# Patient Record
Sex: FEMALE | Race: ASIAN | NOT HISPANIC OR LATINO | Employment: OTHER | URBAN - METROPOLITAN AREA
[De-identification: names, ages, dates, MRNs, and addresses within clinical notes are randomized per-mention and may not be internally consistent; named-entity substitution may affect disease eponyms.]

---

## 2017-04-08 ENCOUNTER — THERAPY VISIT (OUTPATIENT)
Dept: PHYSICAL THERAPY | Facility: CLINIC | Age: 72
End: 2017-04-08
Payer: MEDICARE

## 2017-04-08 DIAGNOSIS — M54.2 CERVICALGIA: Primary | ICD-10-CM

## 2017-04-08 PROCEDURE — 97161 PT EVAL LOW COMPLEX 20 MIN: CPT | Mod: GP | Performed by: PHYSICAL THERAPIST

## 2017-04-08 PROCEDURE — 97140 MANUAL THERAPY 1/> REGIONS: CPT | Mod: GP | Performed by: PHYSICAL THERAPIST

## 2017-04-08 PROCEDURE — G8982 BODY POS GOAL STATUS: HCPCS | Mod: GP | Performed by: PHYSICAL THERAPIST

## 2017-04-08 PROCEDURE — G8981 BODY POS CURRENT STATUS: HCPCS | Mod: GP | Performed by: PHYSICAL THERAPIST

## 2017-04-08 NOTE — LETTER
DEPARTMENT OF HEALTH AND HUMAN SERVICES  CENTERS FOR MEDICARE & MEDICAID SERVICES    PLAN/UPDATED PLAN OF PROGRESS FOR OUTPATIENT REHABILITATION  PATIENTS NAME:  Mary Waters   : 1945  PROVID[facility] NUMBER:    [id number]  [id number]    [facility]  MEDICAL RECORD NUMBER: [id number]   START OF CARE DATE:  SOC Date: 17   TYPE:  PT  PRIMARY/TREATMENT DIAGNOSIS: (Pertinent Medical Diagnosis)  Cervicalgia  VISITS FROM START OF CARE:  Rxs Used: 1   Subjective:  Mary Waters is a 71 year old female with a cervical spine condition.      This is a new condition  Sudden onset of severe pain and stiffness in R neck/shoulder.  She was trying to aggressively stretch this and then started having tingling down R arm and to all her fingers..    Patient reports pain:  Cervical right side.  Radiates to: tignling down R arm.  Pain is described as aching and cramping Episode frequency: constant stiff and sore on R side, but tingling is very intermittent. Pain Scale: 2-3/10.  Associated symptoms:  Tingling. Pain is worse during the day.  Exacerbated by: practicing drumming. and relieved by heat (lying down, massage).  Since onset symptoms are unchanged.  Special testing: none.     General health as reported by patient is good.  Pertinent medical history includes:  Osteoarthritis and history of fractures.    Other surgeries include:  Other (hsterectomy fracture on right leg).  Current medications:  None as reported by patient.  Current occupation is Just retired  Barriers include:  None as reported by patient.  Red flags:  None as reported by patient.  Oswestry Score: 0 %                 Objective:  Standing Alignment:    Shoulder/UE:  Rounded shoulders and protracted scapula R  Cervical/Thoracic Evaluation  AROM:  AROM Cervical:  Flexion:          40  Extension:       47+  Rotation:         Left:     Right:  Side Bend:      Left: 20(nice stretch)     Right:   30+/-  Headaches: none  Cervical Myotomes:  normal  Cervical Palpation:    Tenderness present at Right:    Upper Trap and Levator  Spinal Segmental Conclusions:  Hypomobile C6-8        Shoulder Evaluation:  ROM:  AROM:  normal    Assessment/Plan:    Patient is a 71 year old female with cervical and lumbar complaints.    Patient has the following significant findings with corresponding treatment plan.                Diagnosis 1:  Neck pain  Pain -  mechanical traction, manual therapy, self management, education, directional preference exercise and home program  Decreased ROM/flexibility - manual therapy, therapeutic exercise and home program  Impaired muscle performance - neuro re-education and home program  Decreased function - therapeutic activities and home program      Therapy Evaluation Codes:   1) History comprised of:   Personal factors that impact the plan of care:      None.    Comorbidity factors that impact the plan of care are:      None.     Medications impacting care: None.  2) Examination of Body Systems comprised of:   Body structures and functions that impact the plan of care:      Cervical spine and Lumbar spine.   Activity limitations that impact the plan of care are:      Lifting, Sports and Walking.  3) Clinical presentation characteristics are:   Stable/Uncomplicated.  4) Decision-Making    Low complexity using standardized patient assessment instrument and/or measureable assessment of functional outcome.    Cumulative Therapy Evaluation is: Low complexity.  Previous and current functional limitations:  (See Goal Flow Sheet for this information)    Short term and Long term goals: (See Goal Flow Sheet for this information)   Communication ability:  Patient appears to be able to clearly communicate and understand verbal and written communication and follow directions correctly.  Treatment Explanation - The following has been discussed with the patient:   RX ordered/plan of care  Anticipated  "outcomes  Possible risks and side effects  This patient would benefit from PT intervention to resume normal activities.   Rehab potential is good.  Frequency:  1 X week, once daily  Duration:  for 3 months  Discharge Plan:  Achieve all LTG.  Independent in home treatment program.  Reach maximal therapeutic benefit.          Caregiver Signature/Credentials _____________________________ Date ________       Treating Provider: joann Avery   I have reviewed and certified the need for these services and plan of treatment while under my care.     PHYSICIAN'S SIGNATURE:   _________________________________________  Date___________   Sam Lanier  Certification period:  Beginning of Cert date period: 04/08/17 to  End of Cert period date: 07/06/17     Functional Level Progress Report: Please see attached \"Goal Flow sheet for Functional level.\"    ____X____ Continue Services or       ________ DC Services                Service dates: From  SOC Date: 04/08/17 date to present                         "

## 2017-04-08 NOTE — MR AVS SNAPSHOT
"              After Visit Summary   4/8/2017    Mary Doi    MRN: [id number]           Patient Information     Date Of Birth          1945        Visit Information        Provider Department      4/8/2017 9:10 AM Pallavi Ruiz PT [facility]        Today's Diagnoses     Cervicalgia    -  1       Follow-ups after your visit        Your next 10 appointments already scheduled     Apr 18, 2017  9:40 AM CDT   [facility] with Pallavi Ruiz PT   [facility] ([facility] [place]  )    [address]  [place] [place] [address] [phone]              Who to contact     If you have questions or need follow up information about today's clinic visit or your schedule please contact [place] [facility] directly at [phone].  Normal or non-critical lab and imaging results will be communicated to you by [employer], letter or phone within 4 business days after the clinic has received the results. If you do not hear from us within 7 days, please contact the clinic through [employer] or phone. If you have a critical or abnormal lab result, we will notify you by phone as soon as possible.  Submit refill requests through [employer] or call your pharmacy and they will forward the refill request to us. Please allow 3 business days for your refill to be completed.          Additional Information About Your Visit        MyChart Information     [employer] lets you send messages to your doctor, view your test results, renew your prescriptions, schedule appointments and more. To sign up, go to [email or web address] . Click on \"Log in\" on the left side of the screen, which will take you to the Welcome page. Then click on \"Sign up Now\" on the right side of the page.     You will be asked to enter the access code listed below, as well as some personal information. Please follow the directions to create your " username and password.     Your access code is: [email or web address]  Expires: 2017 12:21 PM     Your access code will  in 90 days. If you need help or a new code, please call your [place] clinic or [phone].        Care EveryWhere ID     This is your Care EveryWhere ID. This could be used by other organizations to access your [facility] medical records  [id number]         Blood Pressure from Last 3 Encounters:   05 110/72    Weight from Last 3 Encounters:   05 63.5 kg (140 lb)              We Performed the Following     HC PT EVAL, LOW COMPLEXITY     [facility] CERT REPORT     [facility] INITIAL EVAL REPORT     MANUAL THER TECH,1+REGIONS,EA 15 MIN        Primary Care Provider    None Specified       No primary provider on file.        Thank you!     Thank you for choosing [place] [facility]  for your care. Our goal is always to provide you with excellent care. Hearing back from our patients is one way we can continue to improve our services. Please take a few minutes to complete the written survey that you may receive in the mail after your visit with us. Thank you!             Your Updated Medication List - Protect others around you: Learn how to safely use, store and throw away your medicines at www.disposemymeds.org.          This list is accurate as of: 17 12:21 PM.  Always use your most recent med list.                   Brand Name Dispense Instructions for use    CALCIUM 500 + D 500-200 MG-IU Tabs          METROGEL VAGINAL 0.75 % vaginal gel   Generic drug:  metroNIDAZOLE     qs    1 applicator BID x 5 days

## 2017-04-08 NOTE — PROGRESS NOTES
Subjective:    Mary Waters is a 71 year old female with a cervical spine condition.      This is a new condition  Sudden onset of severe pain and stiffness in R neck/shoulder.  She was trying to aggressively stretch this and then started having tingling down R arm and to all her fingers.  She saw her MD for this on 3/23/17.    She also report mild low back pain upon first waking in the am that is an ache and rated 2/10.  She also notes R lateral hip and leg pain that comes on with prolonged standing and walking 15-20 minutes.  This pain is achy and rated 5/10.  Sitting down or rubbing it makes it go away.  This has been present for a couple years, but discussed seeing PT for it on 3/23/17.  It seems to be unchanged in the past year..    Patient reports pain:  Cervical right side.  Radiates to: tignling down R arm.  Pain is described as aching and cramping Episode frequency: constant stiff and sore on R side, but tingling is very intermittent. Pain Scale: 2-3/10.  Associated symptoms:  Tingling. Pain is worse during the day.  Exacerbated by: practicing drumming. and relieved by heat (lying down, massage).  Since onset symptoms are unchanged.  Special testing: none.                                  Patient is a 72 year old female presenting with rehab cervical spine hpi.                       Objective:    Standing Alignment:      Shoulder/UE:  Rounded shoulders and protracted scapula R                             Lumbar/SI Evaluation  ROM:    AROM Lumbar:   Flexion:          100%  Ext:                    50%   Side Bend:        Left:     Right:   Rotation:           Left:     Right:   Side Glide:        Left:  100%    Right:  100%          Lumbar Myotomes:  normal (B glut med 5-/5.  Abdominal strength 3+/5.  Able to pull towel out after legs dropped 15 degrees.)                    Lumbar Palpation:  Palpation (lumbar): no tenderness at B greater troch or bursa.                 Cervical/Thoracic  Evaluation    AROM:  AROM Cervical:    Flexion:          40  Extension:       47+  Rotation:         Left:     Right:  Side Bend:      Left: 20(nice stretch)     Right:  30+/-      Headaches: none  Cervical Myotomes:  normal                        Cervical Palpation:      Tenderness present at Right:    Upper Trap and Levator      Spinal Segmental Conclusions:  Hypomobile C6-8               Shoulder Evaluation:  ROM:  AROM:  normal                                                                             General     ROS    Assessment/Plan:      Patient is a 71 year old female with cervical and lumbar complaints.    Patient has the following significant findings with corresponding treatment plan.                Diagnosis 1:  Neck pain  Pain -  mechanical traction, manual therapy, self management, education, directional preference exercise and home program  Decreased ROM/flexibility - manual therapy, therapeutic exercise and home program  Impaired muscle performance - neuro re-education and home program  Decreased function - therapeutic activities and home program    Therapy Evaluation Codes:   1) History comprised of:   Personal factors that impact the plan of care:      None.    Comorbidity factors that impact the plan of care are:      None.     Medications impacting care: None.  2) Examination of Body Systems comprised of:   Body structures and functions that impact the plan of care:      Cervical spine and Lumbar spine.   Activity limitations that impact the plan of care are:      Lifting, Sports and Walking.  3) Clinical presentation characteristics are:   Stable/Uncomplicated.  4) Decision-Making    Low complexity using standardized patient assessment instrument and/or measureable assessment of functional outcome.  Cumulative Therapy Evaluation is: Low complexity.    Previous and current functional limitations:  (See Goal Flow Sheet for this information)    Short term and Long term goals: (See Goal Flow Sheet  for this information)     Communication ability:  Patient appears to be able to clearly communicate and understand verbal and written communication and follow directions correctly.  Treatment Explanation - The following has been discussed with the patient:   RX ordered/plan of care  Anticipated outcomes  Possible risks and side effects  This patient would benefit from PT intervention to resume normal activities.   Rehab potential is good.    Frequency:  1 X week, once daily  Duration:  for 3 months  Discharge Plan:  Achieve all LTG.  Independent in home treatment program.  Reach maximal therapeutic benefit.    Please refer to the daily flowsheet for treatment today, total treatment time and time spent performing 1:1 timed codes.

## 2017-04-08 NOTE — LETTER
DEPARTMENT OF HEALTH AND HUMAN SERVICES  CENTERS FOR MEDICARE & MEDICAID SERVICES    PLAN/UPDATED PLAN OF PROGRESS FOR OUTPATIENT REHABILITATION    PATIENTS NAME:  Mary Waters   : 1945  PR[facility] NUMBER:    [id number]  [id number]   [facility]  MEDICAL RECORD NUMBER: [id number]   START OF CARE DATE:  SOC Date: 17   TYPE:  PT  PRIMARY/TREATMENT DIAGNOSIS: (Pertinent Medical Diagnosis)  Cervicalgia  VISITS FROM START OF CARE:  Rxs Used: 1   Subjective:  Mary Waters is a 71 year old female with a cervical spine condition.      This is a new condition  Sudden onset of severe pain and stiffness in R neck/shoulder.  She was trying to aggressively stretch this and then started having tingling down R arm and to all her fingers.  She saw her MD for this on 3/23/17.  She also report mild low back pain upon first waking in the am that is an ache and rated 2/10.  She also notes R lateral hip and leg pain that comes on with prolonged standing and walking 15-20 minutes.  This pain is achy and rated 5/10.  Sitting down or rubbing it makes it go away.  This has been present for a couple years, but discussed seeing PT for it on 3/23/17.  It seems to be unchanged in the past year..    Patient reports pain:  Cervical right side.  Radiates to: tignling down R arm.  Pain is described as aching and cramping Episode frequency: constant stiff and sore on R side, but tingling is very intermittent. Pain Scale: 2-3/10.  Associated symptoms:  Tingling. Pain is worse during the day.  Exacerbated by: practicing drumming. and relieved by heat (lying down, massage).  Since onset symptoms are unchanged.  Special testing: none.                      General health as reported by patient is good.  Pertinent medical history includes:  Osteoarthritis and history of fractures.    Other surgeries include:  Other (hsterectomy fracture on right leg).  Current medications:  None as  reported by patient.  Current occupation is Just retired  Barriers include:  None as reported by patient.  Red flags:  None as reported by patient.  Oswestry Score: 0 %               Patient is a 72 year old female presenting with rehab cervical spine hpi.   Objective:  Standing Alignment:    Shoulder/UE:  Rounded shoulders and protracted scapula R  Lumbar/SI Evaluation  ROM:    AROM Lumbar:   Flexion:          100%  Ext:                    50%   Side Bend:        Left:     Right:   Rotation:           Left:     Right:   Side Glide:        Left:  100%    Right:  100%    Lumbar Myotomes:  normal (B glut med 5-/5.  Abdominal strength 3+/5.  Able to pull towel out after legs dropped 15 degrees.)  Lumbar Palpation:  Palpation (lumbar): no tenderness at B greater troch or bursa.  Cervical/Thoracic Evaluation  AROM:  AROM Cervical:  Flexion:          40  Extension:       47+  Rotation:         Left:     Right:  Side Bend:      Left: 20(nice stretch)     Right:  30+/-  Headaches: none  Cervical Myotomes:  normal  Cervical Palpation:    Tenderness present at Right:    Upper Trap and Levator  Spinal Segmental Conclusions:  Hypomobile C6-8  Shoulder Evaluation:  ROM:  AROM:  normal    Assessment/Plan:    Patient is a 71 year old female with cervical and lumbar complaints.    Patient has the following significant findings with corresponding treatment plan.                Diagnosis 1:  Neck pain  Pain -  mechanical traction, manual therapy, self management, education, directional preference exercise and home program  Decreased ROM/flexibility - manual therapy, therapeutic exercise and home program  Impaired muscle performance - neuro re-education and home program  Decreased function - therapeutic activities and home program  Therapy Evaluation Codes:   1) History comprised of:   Personal factors that impact the plan of care:      None.    Comorbidity factors that impact the plan of care are:      None.     Medications impacting  "care: None.  2) Examination of Body Systems comprised of:   Body structures and functions that impact the plan of care:      Cervical spine and Lumbar spine.   Activity limitations that impact the plan of care are:      Lifting, Sports and Walking.  3) Clinical presentation characteristics are:   Stable/Uncomplicated.  4) Decision-Making    Low complexity using standardized patient assessment instrument and/or measureable assessment of functional outcome.  Cumulative Therapy Evaluation is: Low complexity.  Previous and current functional limitations:  (See Goal Flow Sheet for this information)    Short term and Long term goals: (See Goal Flow Sheet for this information)   Communication ability:  Patient appears to be able to clearly communicate and understand verbal and written communication and follow directions correctly.      Treatment Explanation - The following has been discussed with the patient:   RX ordered/plan of care  Anticipated outcomes  Possible risks and side effects  This patient would benefit from PT intervention to resume normal activities.   Rehab potential is good.  Frequency:  1 X week, once daily  Duration:  for 3 months  Discharge Plan:  Achieve all LTG.  Independent in home treatment program.  Reach maximal therapeutic benefit.  Caregiver Signature/Credentials _____________________________ Date ________       Treating Provider: joann Avery   I have reviewed and certified the need for these services and plan of treatment while under my care.      PHYSICIAN'S SIGNATURE:   _________________________________________  Date___________   [place]    Certification period:  Beginning of Cert date period: 04/08/17 to  End of Cert period date: 07/06/17   Functional Level Progress Report: Please see attached \"Goal Flow sheet for Functional level.\"  ____X____ Continue Services or       ________ DC Services                Service dates: From  SOC Date: 04/08/17 date to present                         "

## 2017-04-10 NOTE — PROGRESS NOTES
Subjective:                                     General health as reported by patient is good.  Pertinent medical history includes:  Osteoarthritis and history of fractures.    Other surgeries include:  Other (hsterectomy fracture on right leg).  Current medications:  None as reported by patient.  Current occupation is Just retired  .        Barriers include:  None as reported by patient.    Red flags:  None as reported by patient.      Oswestry Score: 0 %                 Objective:    System    Physical Exam    General     ROS    Assessment/Plan:

## 2017-04-18 ENCOUNTER — THERAPY VISIT (OUTPATIENT)
Dept: PHYSICAL THERAPY | Facility: CLINIC | Age: 72
End: 2017-04-18
Payer: MEDICARE

## 2017-04-18 DIAGNOSIS — M54.2 CERVICALGIA: ICD-10-CM

## 2017-04-18 PROCEDURE — 97110 THERAPEUTIC EXERCISES: CPT | Mod: GP | Performed by: PHYSICAL THERAPIST

## 2017-04-18 PROCEDURE — 97140 MANUAL THERAPY 1/> REGIONS: CPT | Mod: GP | Performed by: PHYSICAL THERAPIST

## 2017-05-04 ENCOUNTER — THERAPY VISIT (OUTPATIENT)
Dept: PHYSICAL THERAPY | Facility: CLINIC | Age: 72
End: 2017-05-04
Payer: MEDICARE

## 2017-05-04 DIAGNOSIS — M54.2 CERVICALGIA: ICD-10-CM

## 2017-05-04 PROCEDURE — 97110 THERAPEUTIC EXERCISES: CPT | Mod: GP | Performed by: PHYSICAL THERAPIST

## 2017-06-15 ENCOUNTER — THERAPY VISIT (OUTPATIENT)
Dept: PHYSICAL THERAPY | Facility: CLINIC | Age: 72
End: 2017-06-15
Payer: MEDICARE

## 2017-06-15 DIAGNOSIS — M54.2 CERVICALGIA: ICD-10-CM

## 2017-06-15 PROCEDURE — 97140 MANUAL THERAPY 1/> REGIONS: CPT | Mod: GP | Performed by: PHYSICAL THERAPIST

## 2017-06-15 PROCEDURE — G8983 BODY POS D/C STATUS: HCPCS | Mod: GP | Performed by: PHYSICAL THERAPIST

## 2017-06-15 PROCEDURE — 97110 THERAPEUTIC EXERCISES: CPT | Mod: GP | Performed by: PHYSICAL THERAPIST

## 2017-06-15 PROCEDURE — G8982 BODY POS GOAL STATUS: HCPCS | Mod: GP | Performed by: PHYSICAL THERAPIST

## 2017-06-15 NOTE — PROGRESS NOTES
Subjective:    HPI  Oswestry Score: 0 %                 Objective:    System    Physical Exam    General     ROS    Assessment/Plan:      DISCHARGE REPORT    Progress reporting period is from 4/8/17 to 6/15/17.       SUBJECTIVE  Subjective changes noted by patient:   Patient just got back from a month in [place].  She had very little to no pain in R hip.  Her stretches helped a lot.  Hasn't had any R neck or arm pain/tingling, but also hasn't been drumming.  She will start back at drumming class this Sunday, but will use only the little drum.    Current Pain level:  (0/10).     Initial Pain level:  (2-3/10 neck, 4/10 R leg).   Changes in function:  Yes (See Goal flowsheet attached for changes in current functional level)  Adverse reaction to treatment or activity: None    OBJECTIVE  Changes noted in objective findings:  Yes,      Cervical ROM: Flex 40, ext 47 and painfree now, L SB 25 and R SB 30 and painfree.    Lumbar ROM: Flex 100%, ext 75%, B %.    Abdominal strength:4+/5.  Able to lower legs 50 degrees before towel comes out.    Glut med 5/5 B.    +OBers B.       ASSESSMENT/PLAN  Updated problem list and treatment plan: Diagnosis 1:  Neck pain  Decreased ROM/flexibility - manual therapy, therapeutic exercise and home program  Decreased strength - therapeutic exercise, therapeutic activities and home program  Impaired muscle performance - neuro re-education and home program  STG/LTGs have been met or progress has been made towards goals:  Yes (See Goal flow sheet completed today.)  Assessment of Progress: The patient's condition is improving.  Self Management Plans:  Patient is independent in a home treatment program.  Patient is independent in self management of symptoms.    Mary continues to require the following intervention to meet STG and LTG's:  PT intervention is no longer required to meet STG/LTG.    Recommendations:  This patient is ready to be discharged from therapy and continue their home  treatment program.    Please refer to the daily flowsheet for treatment today, total treatment time and time spent performing 1:1 timed codes.

## 2017-06-15 NOTE — LETTER
[facility] ATHLETIC [facility] PHYSICAL [facility]  [address]  [place] [id number] [phone]    2017    Re: Mary Waters   :   1945  MRN:  [id number]   REFERRING PHYSICIAN:   Armond Hill    [facility] ATHLETIC [facility] PHYSICAL [facility]    Date of Initial Evaluation:  2017  Visits:  Rxs Used: 4  Reason for Referral:  Cervicalgia    DISCHARGE REPORT    Progress reporting period is from 17 to 6/15/17.       SUBJECTIVE  Subjective changes noted by patient:   Patient just got back from a month in [place].  She had very little to no pain in R hip.  Her stretches helped a lot.  Hasn't had any R neck or arm pain/tingling, but also hasn't been drumming.  She will start back at drumming class this , but will use only the little drum.    Current Pain level:  (0/10).     Initial Pain level:  (2-3/10 neck, 4/10 R leg).   Changes in function:  Yes (See Goal flowsheet attached for changes in current functional level)  Adverse reaction to treatment or activity: None    OBJECTIVE  Changes noted in objective findings:  Yes,      Cervical ROM: Flex 40, ext 47 and painfree now, L SB 25 and R SB 30 and painfree.    Lumbar ROM: Flex 100%, ext 75%, B %.    Abdominal strength:4+/5.  Able to lower legs 50 degrees before towel comes out.    Glut med 5/5 B.    +OBers B.     ASSESSMENT/PLAN  Updated problem list and treatment plan: Diagnosis 1:  Neck pain  Decreased ROM/flexibility - manual therapy, therapeutic exercise and home program  Decreased strength - therapeutic exercise, therapeutic activities and home program  Impaired muscle performance - neuro re-education and home program  STG/LTGs have been met or progress has been made towards goals:  Yes (See Goal flow sheet completed today.)  Assessment of Progress: The patient's condition is improving.  Self Management Plans:  Patient is independent in a home treatment program.  Patient is independent in self  management of symptoms.  Mary continues to require the following intervention to meet STG and LTG's:  PT intervention is no longer required to meet STG/LTG.    Recommendations:  This patient is ready to be discharged from therapy and continue their home treatment program.          Thank you for your referral.    INQUIRIES  Therapist: Pallavi Ruiz [facility]   [facility] PHYSICAL THERAPY  [address]  [place] [address]  Phone: [phone]  Fax: [phone]

## 2017-06-15 NOTE — MR AVS SNAPSHOT
"              After Visit Summary   6/15/2017    Mary Doi    MRN: [id number]           Patient Information     Date Of Birth          1945        Visit Information        Provider Department      6/15/2017 10:20 AM Pallavi Ruiz PT [facility] Athletic [facility] Physical [facility]        Today's Diagnoses     Cervicalgia           Follow-ups after your visit        Who to contact     If you have questions or need follow up information about today's clinic visit or your schedule please contact [facility] ATHLETIC [facility] PHYSICAL [facility] directly at [phone].  Normal or non-critical lab and imaging results will be communicated to you by FOODithart, letter or phone within 4 business days after the clinic has received the results. If you do not hear from us within 7 days, please contact the clinic through FOODithart or phone. If you have a critical or abnormal lab result, we will notify you by phone as soon as possible.  Submit refill requests through [employer] or call your pharmacy and they will forward the refill request to us. Please allow 3 business days for your refill to be completed.          Additional Information About Your Visit        MyChart Information     [employer] lets you send messages to your doctor, view your test results, renew your prescriptions, schedule appointments and more. To sign up, go to [email or web address] . Click on \"Log in\" on the left side of the screen, which will take you to the Welcome page. Then click on \"Sign up Now\" on the right side of the page.     You will be asked to enter the access code listed below, as well as some personal information. Please follow the directions to create your username and password.     Your access code is: [email or web address]  Expires: 2017 12:21 PM     Your access code will  in 90 days. If you need help or a new code, please call your [place] clinic or [phone].        Care EveryWhere ID     This is your Care EveryWhere " ID. This could be used by other organizations to access your [place] medical records  [id number]         Blood Pressure from Last 3 Encounters:   02/18/05 110/72    Weight from Last 3 Encounters:   02/18/05 63.5 kg (140 lb)              We Performed the Following     [facility] PROGRESS NOTES REPORT     MANUAL THER TECH,1+REGIONS,EA 15 MIN     THERAPEUTIC EXERCISES        Primary Care Provider    None Specified       No primary provider on file.        Thank you!     Thank you for choosing [facility] PHYSICAL THERAPY  for your care. Our goal is always to provide you with excellent care. Hearing back from our patients is one way we can continue to improve our services. Please take a few minutes to complete the written survey that you may receive in the mail after your visit with us. Thank you!             Your Updated Medication List - Protect others around you: Learn how to safely use, store and throw away your medicines at www.disposemymeds.org.          This list is accurate as of: 6/15/17  2:06 PM.  Always use your most recent med list.                   Brand Name Dispense Instructions for use    CALCIUM 500 + D 500-200 MG-IU Tabs          METROGEL VAGINAL 0.75 % vaginal gel   Generic drug:  metroNIDAZOLE     qs    1 applicator BID x 5 days

## 2017-09-07 ENCOUNTER — THERAPY VISIT (OUTPATIENT)
Dept: PHYSICAL THERAPY | Facility: CLINIC | Age: 72
End: 2017-09-07
Payer: MEDICARE

## 2017-09-07 DIAGNOSIS — M54.41 RIGHT-SIDED LOW BACK PAIN WITH RIGHT-SIDED SCIATICA, UNSPECIFIED CHRONICITY: Primary | ICD-10-CM

## 2017-09-07 PROCEDURE — 97140 MANUAL THERAPY 1/> REGIONS: CPT | Mod: GP | Performed by: PHYSICAL THERAPIST

## 2017-09-07 PROCEDURE — G8982 BODY POS GOAL STATUS: HCPCS | Mod: GP | Performed by: PHYSICAL THERAPIST

## 2017-09-07 PROCEDURE — G8981 BODY POS CURRENT STATUS: HCPCS | Mod: GP | Performed by: PHYSICAL THERAPIST

## 2017-09-07 PROCEDURE — 97110 THERAPEUTIC EXERCISES: CPT | Mod: GP | Performed by: PHYSICAL THERAPIST

## 2017-09-07 PROCEDURE — 97164 PT RE-EVAL EST PLAN CARE: CPT | Mod: GP | Performed by: PHYSICAL THERAPIST

## 2017-09-07 NOTE — MR AVS SNAPSHOT
After Visit Summary   9/7/2017    Mary Waters    MRN: [id number]           Patient Information     Date Of Birth          1945        Visit Information        Provider Department      9/7/2017 9:40 AM Pallavi Ruiz PT [facility] Athletic [facility] Physical Therapy        Today's Diagnoses     Right-sided low back pain with right-sided sciatica, unspecified chronicity    -  1       Follow-ups after your visit        Your next 10 appointments already scheduled     Sep 14, 2017  9:40 AM CDT   [facility] Spine with Pallavi Ruiz PT   [facility] Athletic [facility] Physical Therapy ([facility]  )    [address]  [place] [address]   [phone]            Sep 19, 2017 10:20 AM CDT   [facility] Spine with Pallavi Ruiz PT   [facility] Physical Therapy ([facility]  )    [address]  [facility] [facility] [address]   [phone]            Sep 26, 2017  9:40 AM CDT   [facility] Spine with Pallavi Ruiz PT   [facility] Physical Therapy ([facility]  )    [address]  [facility] [place] [address]   [phone]              Who to contact     If you have questions or need follow up information about today's clinic visit or your schedule please contact [facility] ATHLETIC [facility] PHYSICAL THERAPY directly at [phone].  Normal or non-critical lab and imaging results will be communicated to you by MyChart, letter or phone within 4 business days after the clinic has received the results. If you do not hear from us within 7 days, please contact the clinic through [employer]Swim[employer] or phone. If you have a critical or abnormal lab result, we will notify you by phone as soon as possible.  Submit refill requests through ZUCHEM or call your pharmacy and they will forward the refill request to us. Please allow 3 business days for your refill to be completed.          Additional Information About Your Visit       "  MyChart Information     Aidin lets you send messages to your doctor, view your test results, renew your prescriptions, schedule appointments and more. To sign up, go to [email or web address] . Click on \"Log in\" on the left side of the screen, which will take you to the Welcome page. Then click on \"Sign up Now\" on the right side of the page.     You will be asked to enter the access code listed below, as well as some personal information. Please follow the directions to create your username and password.     Your access code is: [email or web address]  Expires: 2017  1:27 PM     Your access code will  in 90 days. If you need help or a new code, please call your [place] clinic or [phone].        Care EveryWhere ID     This is your Care EveryWhere ID. This could be used by other organizations to access your [place] medical records  [id number]         Blood Pressure from Last 3 Encounters:   05 110/72    Weight from Last 3 Encounters:   05 63.5 kg (140 lb)              We Performed the Following     FLORES CERT REPORT     [facility] PROGRESS NOTES REPORT     MANUAL THER TECH,1+REGIONS,EA 15 MIN     PT Re-Eval (48204)     THERAPEUTIC EXERCISES        Primary Care Provider    None Specified       No primary provider on file.        Equal Access to Services     BRAVO IBRAHIM : Hadirene seayo Soelisabet, waaxda luqadaha, qaybta kaalmada adelauren, alonso dennison . So [facility] [phone].    ATENCIÓN: Si habla español, tiene a hancock disposición servicios gratuitos de asistencia lingüística. Llame al [phone].    We comply with applicable federal civil rights laws and [place] laws. We do not discriminate on the basis of race, color, national origin, age, disability sex, sexual orientation or gender identity.            Thank you!     Thank you for choosing [facility] PHYSICAL THERAPY  for your care. Our goal is always to provide you with excellent " care. Hearing back from our patients is one way we can continue to improve our services. Please take a few minutes to complete the written survey that you may receive in the mail after your visit with us. Thank you!             Your Updated Medication List - Protect others around you: Learn how to safely use, store and throw away your medicines at www.disposemymeds.org.          This list is accurate as of: 9/7/17  1:27 PM.  Always use your most recent med list.                   Brand Name Dispense Instructions for use Diagnosis    CALCIUM 500 + D 500-200 MG-IU Tabs           METROGEL VAGINAL 0.75 % vaginal gel   Generic drug:  metroNIDAZOLE     qs    1 applicator BID x 5 days    Vaginitis and vulvovaginitis, unspecified

## 2017-09-07 NOTE — PROGRESS NOTES
Subjective:    HPI                    Objective:    System    Physical Exam    General     ROS    Assessment/Plan:      PROGRESS  REPORT    Progress reporting period is from 6/15/17 to 9/7/17.       SUBJECTIVE  Subjective changes noted by patient:   Patient reports that since our last visit in June, she has noticed gradually more pain at lateral R hip.  Then on Monday, 9/4, she had to run to catch a bus [place] and her hip and whole leg felt very stiff and sore.  She has also started to have min pain with lying supine so she's had to switch to lying on her side.  She rates the pain as mild and 3-4/10, but is now almost constant.   Patient's MD thought her pain was coming from her back due to DDD, but it almost seems more from the actual hip joint now.   Current pain level is 3-4/10 R hip .      Initial Pain level:  (2-3/10 neck, 4/10 R leg).   Changes in function:  Yes, patient has had a set back and more focused hip pain  Adverse reaction to treatment or activity: activity - walking and running    OBJECTIVE  Changes noted in objective findings:       Lumbar ROM: flex to floor, ext 75%+, B SG 75%+.    +MINGO, FADIR and compression/rotation on the R, - on the L.    L hip AROM: flex 120, IR 42, ER 35, ext 12.    R hip AROM: flex 120+, IR 42+, ER 25+, ext 0   Decreased R hip mobility    ASSESSMENT/PLAN  Updated problem list and treatment plan: Diagnosis 1:  R hip pain  Pain -  manual therapy, self management, education and home program  Decreased ROM/flexibility - manual therapy, therapeutic exercise and home program  Decreased joint mobility - manual therapy, therapeutic exercise and home program  Decreased strength - therapeutic exercise, therapeutic activities and home program  Impaired muscle performance - neuro re-education and home program  Decreased function - therapeutic activities and home program  STG/LTGs have been met or progress has been made towards goals:  No, patient has had  a set back  Assessment of Progress: The patient has had set backs in their progress.  Self Management Plans:  Patient has been instructed in a home treatment program.  Patient  has been instructed in self management of symptoms.  I have re-evaluated this patient and find that the nature, scope, duration and intensity of the therapy is appropriate for the medical condition of the patient.  Mary continues to require the following intervention to meet STG and LTG's:  PT    Recommendations:  This patient would benefit from continued therapy.     Frequency:  1 X week, once daily  Duration:  for 2 months          Please refer to the daily flowsheet for treatment today, total treatment time and time spent performing 1:1 timed codes.

## 2017-09-07 NOTE — LETTER
DEPARTMENT OF HEALTH AND HUMAN SERVICES  CENTERS FOR MEDICARE & MEDICAID SERVICES    PLAN/UPDATED PLAN OF PROGRESS FOR OUTPATIENT REHABILITATION    PATIENTS NAME:  Mary Waters   : 1945  PR[facility] NUMBER:    [id number]  [id number]   PROVIDER NAME: [facility] PHYSICAL THERAPY  MEDICAL RECORD NUMBER: [id number]   START OF CARE DATE:  SOC Date: 17   TYPE:  PT  PRIMARY/TREATMENT DIAGNOSIS: (Pertinent Medical Diagnosis)  Right-sided low back pain with right-sided sciatica, unspecified chronicity  VISITS FROM START OF CARE:  Rxs Used: 5     PROGRESS  REPORT    Progress reporting period is from 6/15/17 to 17.       SUBJECTIVE  Subjective changes noted by patient:   Patient reports that since our last visit in , she has noticed gradually more pain at lateral R hip.  Then on Monday, , she had to run to catch a bus and [place] and her [place] and whole leg felt very stiff and sore.  She has also started to have min pain with lying supine so she's had to switch to lying on her side.  She rates the pain as mild and 3-4/10, but is now almost constant.   Patient's MD thought her pain was coming from her back due to DDD, but it almost seems more from the actual hip joint now.   Current pain level is 3-4/10 R hip .      Initial Pain level:  (2-3/10 neck, 4/10 R leg).   Changes in function:  Yes, patient has had a set back and more focused hip pain  Adverse reaction to treatment or activity: activity - walking and running    OBJECTIVE  Changes noted in objective findings:     Lumbar ROM: flex to floor, ext 75%+, B SG 75%+.    +MINGO, FADIR and compression/rotation on the R, - on the L.    L hip AROM: flex 120, IR 42, ER 35, ext 12.    R hip AROM: flex 120+, IR 42+, ER 25+, ext 0   Decreased R hip mobility    ASSESSMENT/PLAN  Updated problem list and treatment plan: Diagnosis 1:  R hip pain  Pain -  manual therapy, self management, education and home  "program  Decreased ROM/flexibility - manual therapy, therapeutic exercise and home program  Decreased joint mobility - manual therapy, therapeutic exercise and home program  Decreased strength - therapeutic exercise, therapeutic activities and home program  Impaired muscle performance - neuro re-education and home program  Decreased function - therapeutic activities and home program  STG/LTGs have been met or progress has been made towards goals:  No, patient has had a set back  Assessment of Progress: The patient has had set backs in their progress.  Self Management Plans:  Patient has been instructed in a home treatment program.  Patient  has been instructed in self management of symptoms.  I have re-evaluated this patient and find that the nature, scope, duration and intensity of the therapy is appropriate for the medical condition of the patient.  Mary continues to require the following intervention to meet STG and LTG's:  PT    Recommendations:  This patient would benefit from continued therapy.     Frequency:  1 X week, once daily  Duration:  for 2 months          Caregiver Signature/Credentials _____________________________ Date ________       Treating Provider: joann Avery   I have reviewed and certified the need for these services and plan of treatment while under my care.        PHYSICIAN'S SIGNATURE:   _________________________________________  Date___________   Armond DOWNING    Certification period:  Beginning of Cert date period: 07/07/17 to  End of Cert period date: 10/04/17     Functional Level Progress Report: Please see attached \"Goal Flow sheet for Functional level.\"    ____X____ Continue Services or       ________ DC Services                Service dates: From  SOC Date: 04/08/17 date to present                         "

## 2017-09-14 ENCOUNTER — THERAPY VISIT (OUTPATIENT)
Dept: PHYSICAL THERAPY | Facility: CLINIC | Age: 72
End: 2017-09-14
Payer: MEDICARE

## 2017-09-14 DIAGNOSIS — M54.41 RIGHT-SIDED LOW BACK PAIN WITH RIGHT-SIDED SCIATICA, UNSPECIFIED CHRONICITY: ICD-10-CM

## 2017-09-14 PROCEDURE — 97110 THERAPEUTIC EXERCISES: CPT | Mod: GP | Performed by: PHYSICAL THERAPIST

## 2017-09-14 PROCEDURE — 97140 MANUAL THERAPY 1/> REGIONS: CPT | Mod: GP | Performed by: PHYSICAL THERAPIST

## 2017-09-19 ENCOUNTER — THERAPY VISIT (OUTPATIENT)
Dept: PHYSICAL THERAPY | Facility: CLINIC | Age: 72
End: 2017-09-19
Payer: MEDICARE

## 2017-09-19 DIAGNOSIS — M54.41 RIGHT-SIDED LOW BACK PAIN WITH RIGHT-SIDED SCIATICA, UNSPECIFIED CHRONICITY: ICD-10-CM

## 2017-09-19 PROCEDURE — 97110 THERAPEUTIC EXERCISES: CPT | Mod: GP | Performed by: PHYSICAL THERAPIST

## 2017-09-19 PROCEDURE — 97140 MANUAL THERAPY 1/> REGIONS: CPT | Mod: GP | Performed by: PHYSICAL THERAPIST

## 2017-09-26 ENCOUNTER — THERAPY VISIT (OUTPATIENT)
Dept: PHYSICAL THERAPY | Facility: CLINIC | Age: 72
End: 2017-09-26
Payer: MEDICARE

## 2017-09-26 DIAGNOSIS — M54.41 RIGHT-SIDED LOW BACK PAIN WITH RIGHT-SIDED SCIATICA, UNSPECIFIED CHRONICITY: ICD-10-CM

## 2017-09-26 PROCEDURE — 97140 MANUAL THERAPY 1/> REGIONS: CPT | Mod: GP | Performed by: PHYSICAL THERAPIST

## 2017-10-03 ENCOUNTER — THERAPY VISIT (OUTPATIENT)
Dept: PHYSICAL THERAPY | Facility: CLINIC | Age: 72
End: 2017-10-03
Payer: MEDICARE

## 2017-10-03 DIAGNOSIS — M54.41 RIGHT-SIDED LOW BACK PAIN WITH RIGHT-SIDED SCIATICA, UNSPECIFIED CHRONICITY: ICD-10-CM

## 2017-10-03 PROCEDURE — G8982 BODY POS GOAL STATUS: HCPCS | Mod: GP | Performed by: PHYSICAL THERAPIST

## 2017-10-03 PROCEDURE — G8983 BODY POS D/C STATUS: HCPCS | Mod: GP | Performed by: PHYSICAL THERAPIST

## 2017-10-03 PROCEDURE — 97110 THERAPEUTIC EXERCISES: CPT | Mod: GP | Performed by: PHYSICAL THERAPIST

## 2017-10-03 PROCEDURE — 97140 MANUAL THERAPY 1/> REGIONS: CPT | Mod: GP | Performed by: PHYSICAL THERAPIST

## 2017-10-03 NOTE — LETTER
[facility] ATHLETIC [facility] PHYSICAL [facility]  [address]  [place] [id number] [phone]    October 3, 2017    Re: Mary Waters   :   1945  MRN:  [id number]   REFERRING PHYSICIAN:   Armond Hill    [facility] ATHLETIC [facility] PHYSICAL [facility]    Date of Initial Evaluation:  2017  Visits:  Rxs Used: 9  Reason for Referral:  Right-sided low back pain with right-sided sciatica, unspecified chronicity    DISCHARGE REPORT  Progress reporting period is from 17 to 10/3/17.       SUBJECTIVE  Subjective changes noted by patient:   Hip is better, but not 100%.  Can now lie supine for part of the night with just minimal to no pain.  Can walk with 0-2/10 pain     Current Pain level:  (0-2/10).     Initial Pain level:  (3-4/10 R hip).   Changes in function:  Yes (See Goal flowsheet attached for changes in current functional level)  Adverse reaction to treatment or activity: None    OBJECTIVE  Changes noted in objective findings:    R hip AROM: Flex 120(tight not painful this time), ext improved from 0 to 3 degrees and not painful.  IR 42 and not painful this time.  ER improved from 25 to 39 and just tight    +R hip MINGO and FADIR and compression/rotation.    Lumbar ROM: Flex to floor, ext 100% and no longer painful.  B % but min pain with R SG.     ASSESSMENT/PLAN  Updated problem list and treatment plan: Diagnosis 1:  R hip pain  Pain -  self management, education and home program  Decreased ROM/flexibility - manual therapy, therapeutic exercise and home program  STG/LTGs have been met or progress has been made towards goals:  Yes (See Goal flow sheet completed today.)  Assessment of Progress: The patient's condition is improving.  Self Management Plans:  Patient is independent in a home treatment program.  Patient is independent in self management of symptoms.  Mary continues to require the following intervention to meet STG and LTG's:  PT intervention  is no longer required to meet STG/LTG.    Recommendations:  This patient is ready to be discharged from therapy and continue their home treatment program.            Thank you for your referral.    INQUIRIES  Therapist:Pallavi Ruiz [facility] PHYSICAL THERAPY  [address] [place] [address] [phone]  [facility] [address]  Phone: [phone]  Fax: [phone]

## 2017-10-03 NOTE — PROGRESS NOTES
Subjective:    HPI  Oswestry Score: 8.89 %                 Objective:    System    Physical Exam    General     ROS    Assessment/Plan:      DISCHARGE REPORT    Progress reporting period is from 9/7/17 to 10/3/17.       SUBJECTIVE  Subjective changes noted by patient:   Hip is better, but not 100%.  Can now lie supine for part of the night with just minimal to no pain.  Can walk with 0-2/10 pain     Current Pain level:  (0-2/10).     Initial Pain level:  (3-4/10 R hip).   Changes in function:  Yes (See Goal flowsheet attached for changes in current functional level)  Adverse reaction to treatment or activity: None    OBJECTIVE  Changes noted in objective findings:      R hip AROM: Flex 120(tight not painful this time), ext improved from 0 to 3 degrees and not painful.  IR 42 and not painful this time.  ER improved from 25 to 39 and just tight    +R hip MINGO and FADIR and compression/rotation.    Lumbar ROM: Flex to floor, ext 100% and no longer painful.  B % but min pain with R SG.     ASSESSMENT/PLAN  Updated problem list and treatment plan: Diagnosis 1:  R hip pain  Pain -  self management, education and home program  Decreased ROM/flexibility - manual therapy, therapeutic exercise and home program  STG/LTGs have been met or progress has been made towards goals:  Yes (See Goal flow sheet completed today.)  Assessment of Progress: The patient's condition is improving.  Self Management Plans:  Patient is independent in a home treatment program.  Patient is independent in self management of symptoms.    Mary continues to require the following intervention to meet STG and LTG's:  PT intervention is no longer required to meet STG/LTG.    Recommendations:  This patient is ready to be discharged from therapy and continue their home treatment program.    Please refer to the daily flowsheet for treatment today, total treatment time and time spent performing 1:1 timed codes.

## 2017-10-03 NOTE — MR AVS SNAPSHOT
"              After Visit Summary   10/3/2017    Mary Doi    MRN: [id number]           Patient Information     Date Of Birth          1945        Visit Information        Provider Department      10/3/2017 11:00 AM Pallavi Ruiz PT [facility] Athletic [facility] Physical Therapy        Today's Diagnoses     Right-sided low back pain with right-sided sciatica, unspecified chronicity           Follow-ups after your visit        Who to contact     If you have questions or need follow up information about today's clinic visit or your schedule please contact [facility] ATHLETIC [facility] PHYSICAL [facility] directly at [phone].  Normal or non-critical lab and imaging results will be communicated to you by [employer], letter or phone within 4 business days after the clinic has received the results. If you do not hear from us within 7 days, please contact the clinic through [employer] or phone. If you have a critical or abnormal lab result, we will notify you by phone as soon as possible.  Submit refill requests through [employer] or call your pharmacy and they will forward the refill request to us. Please allow 3 business days for your refill to be completed.          Additional Information About Your Visit        MyChart Information     [employer] lets you send messages to your doctor, view your test results, renew your prescriptions, schedule appointments and more. To sign up, go to [email or web address] . Click on \"Log in\" on the left side of the screen, which will take you to the Welcome page. Then click on \"Sign up Now\" on the right side of the page.     You will be asked to enter the access code listed below, as well as some personal information. Please follow the directions to create your username and password.     Your access code is: [email or web address]  Expires: 2017  1:27 PM     Your access code will  in 90 days. If you need help or a new code, please call your [place] clinic or " [phone].        Care EveryWhere ID     This is your Care EveryWhere ID. This could be used by other organizations to access your [place] medical records  [id number]         Blood Pressure from Last 3 Encounters:   02/18/05 110/72    Weight from Last 3 Encounters:   02/18/05 63.5 kg (140 lb)              We Performed the Following     FLORES PROGRESS NOTES REPORT     MANUAL THER TECH,1+REGIONS,EA 15 MIN     THERAPEUTIC EXERCISES        Primary Care Provider    None Specified       No primary provider on file.        Equal Access to Services     BRAVO IBRAHIM : Hadii aad ku hadasho Soomaali, waaxda luqadaha, qaybta kaalmada adeegyada, waxay idiin hayaan adeeg joseph dennison . So [facility] [phone].    ATENCIÓN: Si habla español, tiene a hancock disposición servicios gratuitos de asistencia lingüística. Llame al [phone].    We comply with applicable federal civil rights laws and [place] laws. We do not discriminate on the basis of race, color, national origin, age, disability, sex, sexual orientation, or gender identity.            Thank you!     Thank you for choosing [facility] PHYSICAL THERAPY  for your care. Our goal is always to provide you with excellent care. Hearing back from our patients is one way we can continue to improve our services. Please take a few minutes to complete the written survey that you may receive in the mail after your visit with us. Thank you!             Your Updated Medication List - Protect others around you: Learn how to safely use, store and throw away your medicines at www.disposemymeds.org.          This list is accurate as of: 10/3/17 11:37 AM.  Always use your most recent med list.                   Brand Name Dispense Instructions for use Diagnosis    CALCIUM 500 + D 500-200 MG-IU Tabs           METROGEL-VAGINAL 0.75 % vaginal gel   Generic drug:  metroNIDAZOLE     qs    1 applicator BID x 5 days    Vaginitis and vulvovaginitis, unspecified

## 2017-12-02 ENCOUNTER — THERAPY VISIT (OUTPATIENT)
Dept: PHYSICAL THERAPY | Facility: CLINIC | Age: 72
End: 2017-12-02
Payer: MEDICARE

## 2017-12-02 DIAGNOSIS — M54.41 BILATERAL LOW BACK PAIN WITH RIGHT-SIDED SCIATICA, UNSPECIFIED CHRONICITY: Primary | ICD-10-CM

## 2017-12-02 PROCEDURE — 97112 NEUROMUSCULAR REEDUCATION: CPT | Mod: GP | Performed by: PHYSICAL THERAPIST

## 2017-12-02 PROCEDURE — G8981 BODY POS CURRENT STATUS: HCPCS | Mod: GP | Performed by: PHYSICAL THERAPIST

## 2017-12-02 PROCEDURE — 97110 THERAPEUTIC EXERCISES: CPT | Mod: GP | Performed by: PHYSICAL THERAPIST

## 2017-12-02 PROCEDURE — 97161 PT EVAL LOW COMPLEX 20 MIN: CPT | Mod: GP | Performed by: PHYSICAL THERAPIST

## 2017-12-02 PROCEDURE — G8982 BODY POS GOAL STATUS: HCPCS | Mod: GP | Performed by: PHYSICAL THERAPIST

## 2017-12-02 NOTE — PROGRESS NOTES
Subjective:    Patient is a 72 year old female presenting with rehab back hpi.   Mary Waters is a 72 year old female with a lumbar condition.      This is a chronic condition  Saw Dr Lanier on 11/16 and an xray showed worsening of OA in R Hip.  HOwever, her pain is going down to L lateral leg and lower leg if walking a lot.  She denies back pain.  .    Site of Pain: Lateral right hip.  Radiates to:  Thigh right and lower leg right.   and is intermittent and reported as 2/10.   Pain is worse during the day.  Exacerbated by: standing and druming(always worse that night and next day), prolonged standing and walking(>30 min) and relieved by rest.  Since onset symptoms are unchanged.  Special tests:  X-ray and MRI (L4-5 grade I spondylolisthesis and R apophysial jt degenerative arthrosis.  Multilevel DDD greatest at L5-S1.  Mild L L5-S1 foraminal stenosis).  Previous treatment includes physical therapy.  There was mild improvement following previous treatment.  General health as reported by patient is good.  Pertinent medical history includes:  Osteoarthritis and history of fractures.    Other surgeries include:  Other (hysterectomy and R leg fracture).  Current medications:  None as reported by the patient.  Current occupation is retired.                                    Objective:    Standing Alignment:        Lumbar:  Normal  Pelvic:  Normal                         Lumbar/SI Evaluation  ROM:    AROM Lumbar:   Flexion:          Floor  Ext:                    75%+   Side Bend:        Left:     Right:   Rotation:           Left:     Right:   Side Glide:        Left:  WNL+/-    Right:  WNL        Strength: abdominal strength grossly 4/5(able to pull towel out at 45 degrees of leg lowering)  Lumbar Myotomes:  normal                  Neural Tension/Mobility:      Left side:Slump  negative.     Right side:   Slump  negative.     Functional Tests:    Plank prone:  60(but challenging)/60 sec      Lumbar Provocation:  Lumbar  provocation: + MINGO & FADIR.  Tight with end range flex and limited with end range R hip ext to 3 and ER to 39.                                                     General     ROS    Assessment/Plan:      Patient is a 72 year old female with lumbar complaints.    Patient has the following significant findings with corresponding treatment plan.                Diagnosis 1:  R lumbar radiculopathy  Pain -  self management, education and home program  Decreased strength - therapeutic exercise, therapeutic activities and home program  Impaired muscle performance - neuro re-education and home program  Decreased function - therapeutic activities and home program    Therapy Evaluation Codes:   1) History comprised of:   Personal factors that impact the plan of care:      None.    Comorbidity factors that impact the plan of care are:      None.     Medications impacting care: None.  2) Examination of Body Systems comprised of:   Body structures and functions that impact the plan of care:      Lumbar spine.   Activity limitations that impact the plan of care are:      Standing and Walking.  3) Clinical presentation characteristics are:   Stable/Uncomplicated.  4) Decision-Making    Low complexity using standardized patient assessment instrument and/or measureable assessment of functional outcome.  Cumulative Therapy Evaluation is: Low complexity.    Previous and current functional limitations:  (See Goal Flow Sheet for this information)    Short term and Long term goals: (See Goal Flow Sheet for this information)     Communication ability:  Patient appears to be able to clearly communicate and understand verbal and written communication and follow directions correctly, but with moderate difficulty.  We tried to set up an  for next session, but she called back to say she didn't want one.  Treatment Explanation - The following has been discussed with the patient:   RX ordered/plan of care  Anticipated  outcomes  Possible risks and side effects  This patient would benefit from PT intervention to resume normal activities.   Rehab potential is good.    Frequency:  1 X week, once daily  Duration:  for 4 weeks tapering to 2 X a month over 8 weeks  Discharge Plan:  Achieve all LTG.  Independent in home treatment program.  Reach maximal therapeutic benefit.    Please refer to the daily flowsheet for treatment today, total treatment time and time spent performing 1:1 timed codes.

## 2017-12-02 NOTE — MR AVS SNAPSHOT
After Visit Summary   12/2/2017    Mary Waters    MRN: [id number]           Patient Information     Date Of Birth          1945        Visit Information        Provider Department      12/2/2017 9:50 AM Pallavi Ruiz, PT [facility] Physical Therapy        Today's Diagnoses     Bilateral low back pain with right-sided sciatica, unspecified chronicity    -  1       Follow-ups after your visit        Your next 10 appointments already scheduled     Dec 07, 2017  2:10 PM CST   [facility]OR[facility] Spine with Pallavi Ruiz, PT   [facility] ([facility]  )    [address]  [facility] [address]   [phone]            Dec 14, 2017 11:40 AM CST   FLORES Spine with Pallavi Ruiz PT   [facility] ([facility]  )    [address]  [facility] [address]   [phone]            Dec 21, 2017  9:40 AM CST   FLOR[facility] Spine with Pallavi Ruiz PT   [facility] Therapy ([facility]  )    [address]  [facility] [address]   [phone]              Who to contact     If you have questions or need follow up information about today's clinic visit or your schedule please contact [facility] ATHLETIC [facility] PHYSICAL THERAPY directly at [phone].  Normal or non-critical lab and imaging results will be communicated to you by MyChart, letter or phone within 4 business days after the clinic has received the results. If you do not hear from us within 7 days, please contact the clinic through [email or web address] or phone. If you have a critical or abnormal lab result, we will notify you by phone as soon as possible.  Submit refill requests through [employer] or call your pharmacy and they will forward the refill request to us. Please allow 3 business days for your refill to be completed.          Additional Information About Your Visit       "  MyChart Information     "[employer]" lets you send messages to your doctor, view your test results, renew your prescriptions, schedule appointments and more. To sign up, go to [email or web address]" . Click on \"Log in\" on the left side of the screen, which will take you to the Welcome page. Then click on \"Sign up Now\" on the right side of the page.     You will be asked to enter the access code listed below, as well as some personal information. Please follow the directions to create your username and password.     Your access code is: [email or web address]  Expires: 2017 12:27 PM     Your access code will  in 90 days. If you need help or a new code, please call your [place] clinic or [phone].        Care EveryWhere ID     This is your Care EveryWhere ID. This could be used by other organizations to access your [place] medical records  [id number]         Blood Pressure from Last 3 Encounters:   05 110/72    Weight from Last 3 Encounters:   05 63.5 kg (140 lb)              We Performed the Following     HC PT EVAL, LOW COMPLEXITY     FLORES CERT REPORT     FLORES INITIAL EVAL REPORT     NEUROMUSCULAR RE-EDUCATION     THERAPEUTIC EXERCISES        Primary Care Provider Fax #    Physician No Ref-Primary [phone]       No address on file        Equal Access to Services     BRAVO IBRAHIM : Hadii puja seayo Soelisabet, waaxda luqadaha, qaybta kaalmada adeegyada, alonso dennison . So [facility] [phone].    ATENCIÓN: Si habla español, tiene a hancock disposición servicios gratuitos de asistencia lingüística. Llame al [phone].    We comply with applicable federal civil rights laws and [place] laws. We do not discriminate on the basis of race, color, national origin, age, disability, sex, sexual orientation, or gender identity.            Thank you!     Thank you for choosing [facility] PHYSICAL THERAPY  for your care. Our goal is always to provide you " with excellent care. Hearing back from our patients is one way we can continue to improve our services. Please take a few minutes to complete the written survey that you may receive in the mail after your visit with us. Thank you!             Your Updated Medication List - Protect others around you: Learn how to safely use, store and throw away your medicines at www.disposemymeds.org.          This list is accurate as of: 12/2/17 12:28 PM.  Always use your most recent med list.                   Brand Name Dispense Instructions for use Diagnosis    CALCIUM 500 + D 500-200 MG-IU Tabs           METROGEL-VAGINAL 0.75 % vaginal gel   Generic drug:  metroNIDAZOLE     qs    1 applicator BID x 5 days    Vaginitis and vulvovaginitis, unspecified

## 2017-12-02 NOTE — LETTER
DEPARTMENT OF HEALTH AND HUMAN SERVICES  CENTERS FOR MEDICARE & MEDICAID SERVICES    PLAN/UPDATED PLAN OF PROGRESS FOR OUTPATIENT REHABILITATION    PATIENTS NAME:  Mary Waters   : 1945  PROVIDER NUMBER:    [id number]  [id number]  PROVIDER NAME: [facility] PHYSICAL THERAPY  MEDICAL RECORD NUMBER: [id number]     START OF CARE DATE:  SOC Date: 17   TYPE:  PT    PRIMARY/TREATMENT DIAGNOSIS: (Pertinent Medical Diagnosis)  Bilateral low back pain with right-sided sciatica, unspecified chronicity    VISITS FROM START OF CARE:  Rxs Used: 1     Subjective:  Patient is a 72 year old female presenting with rehab back hpi.   Mary Waters is a 72 year old female with a lumbar condition.      This is a chronic condition  Saw Dr Lanier on  and an xray showed worsening of OA in R Hip.  HOwever, her pain is going down to L lateral leg and lower leg if walking a lot.  She denies back pain.  .    Site of Pain: Lateral right hip.  Radiates to:  Thigh right and lower leg right.   and is intermittent and reported as 2/10.   Pain is worse during the day.  Exacerbated by: standing and druming(always worse that night and next day), prolonged standing and walking(>30 min) and relieved by rest.  Since onset symptoms are unchanged.  Special tests:  X-ray and MRI (L4-5 grade I spondylolisthesis and R apophysial jt degenerative arthrosis.  Multilevel DDD greatest at L5-S1.  Mild L L5-S1 foraminal stenosis).  Previous treatment includes physical therapy.  There was mild improvement following previous treatment.  General health as reported by patient is good.  Pertinent medical history includes:  Osteoarthritis and history of fractures.    Other surgeries include:  Other (hysterectomy and R leg fracture).  Current medications:  None as reported by the patient.  Current occupation is retired.      Objective:  Standing Alignment:    Lumbar:  Normal  Pelvic:  Normal  Lumbar/SI Evaluation  ROM:     AROM Lumbar:   Flexion:          Floor  Ext:                    75%+   Side Bend:        Left:     Right:   Rotation:           Left:     Right:   Side Glide:        Left:  WNL+/-    Right:  WNL  Strength: abdominal strength grossly 4/5(able to pull towel out at 45 degrees of leg lowering)  Lumbar Myotomes:  normal  Neural Tension/Mobility:    Left side:Slump  negative.     Right side:   Slump  negative.   Functional Tests:    Plank prone:  60(but challenging)/60 sec  Lumbar Provocation:  Lumbar provocation: + MINGO & FADIR.  Tight with end range flex and limited with end range R hip ext to 3 and ER to 39.    Assessment/Plan:    Patient is a 72 year old female with lumbar complaints.    Patient has the following significant findings with corresponding treatment plan.                Diagnosis 1:  R lumbar radiculopathy  Pain -  self management, education and home program  Decreased strength - therapeutic exercise, therapeutic activities and home program  Impaired muscle performance - neuro re-education and home program  Decreased function - therapeutic activities and home program    Therapy Evaluation Codes:   1) History comprised of:   Personal factors that impact the plan of care:      None.    Comorbidity factors that impact the plan of care are:      None.     Medications impacting care: None.  2) Examination of Body Systems comprised of:   Body structures and functions that impact the plan of care:      Lumbar spine.   Activity limitations that impact the plan of care are:      Standing and Walking.  3) Clinical presentation characteristics are:   Stable/Uncomplicated.  4) Decision-Making    Low complexity using standardized patient assessment instrument and/or measureable assessment of functional outcome.  Cumulative Therapy Evaluation is: Low complexity.  Previous and current functional limitations:  (See Goal Flow Sheet for this information)    Short term and Long term goals: (See Goal Flow Sheet for this  "information)     Communication ability:  Patient appears to be able to clearly communicate and understand verbal and written communication and follow directions correctly, but with moderate difficulty.  We tried to set up an  for next session, but she called back to say she didn't want one.  Treatment Explanation - The following has been discussed with the patient:   RX ordered/plan of care  Anticipated outcomes  Possible risks and side effects  This patient would benefit from PT intervention to resume normal activities.   Rehab potential is good.    Frequency:  1 X week, once daily  Duration:  for 4 weeks tapering to 2 X a month over 8 weeks  Discharge Plan:  Achieve all LTG.  Independent in home treatment program.  Reach maximal therapeutic benefit.        Caregiver Signature/Credentials _____________________________ Date ________       Treating Provider: joann Avery   I have reviewed and certified the need for these services and plan of treatment while under my care.        PHYSICIAN'S SIGNATURE:   _________________________________________  Date___________   Sam Lanier MD    Certification period:  Beginning of Cert date period: 12/02/17 to  End of Cert period date: 03/01/18     Functional Level Progress Report: Please see attached \"Goal Flow sheet for Functional level.\"    ____X____ Continue Services or       ________ DC Services                Service dates: From  SOC Date: 12/02/17 date to present                         "

## 2017-12-07 ENCOUNTER — THERAPY VISIT (OUTPATIENT)
Dept: PHYSICAL THERAPY | Facility: CLINIC | Age: 72
End: 2017-12-07
Payer: MEDICARE

## 2017-12-07 DIAGNOSIS — M54.41 BILATERAL LOW BACK PAIN WITH RIGHT-SIDED SCIATICA, UNSPECIFIED CHRONICITY: ICD-10-CM

## 2017-12-07 PROCEDURE — 97110 THERAPEUTIC EXERCISES: CPT | Mod: GP | Performed by: PHYSICAL THERAPIST

## 2017-12-07 PROCEDURE — 97112 NEUROMUSCULAR REEDUCATION: CPT | Mod: GP | Performed by: PHYSICAL THERAPIST

## 2017-12-21 ENCOUNTER — THERAPY VISIT (OUTPATIENT)
Dept: PHYSICAL THERAPY | Facility: CLINIC | Age: 72
End: 2017-12-21
Payer: MEDICARE

## 2017-12-21 DIAGNOSIS — M54.41 BILATERAL LOW BACK PAIN WITH RIGHT-SIDED SCIATICA, UNSPECIFIED CHRONICITY: ICD-10-CM

## 2017-12-21 PROCEDURE — 97112 NEUROMUSCULAR REEDUCATION: CPT | Mod: GP | Performed by: PHYSICAL THERAPIST

## 2017-12-21 PROCEDURE — 97110 THERAPEUTIC EXERCISES: CPT | Mod: GP | Performed by: PHYSICAL THERAPIST

## 2017-12-21 NOTE — MR AVS SNAPSHOT
"              After Visit Summary   2017    Mary Waters    MRN: [id number]           Patient Information     Date Of Birth          1945        Visit Information        Provider Department      2017 9:40 AM Pallavi Ruiz, PT [facility] Athletic [facility] Physical [facility]        Today's Diagnoses     Bilateral low back pain with right-sided sciatica, unspecified chronicity           Follow-ups after your visit        Who to contact     If you have questions or need follow up information about today's clinic visit or your schedule please contact [facility] ATHLETIC [facility] PHYSICAL [facility] directly at [phone].  Normal or non-critical lab and imaging results will be communicated to you by [employer], letter or phone within 4 business days after the clinic has received the results. If you do not hear from us within 7 days, please contact the clinic through [employer] or phone. If you have a critical or abnormal lab result, we will notify you by phone as soon as possible.  Submit refill requests through [employer] or call your pharmacy and they will forward the refill request to us. Please allow 3 business days for your refill to be completed.          Additional Information About Your Visit        MyChart Information     [employer] lets you send messages to your doctor, view your test results, renew your prescriptions, schedule appointments and more. To sign up, go to [email or web address] . Click on \"Log in\" on the left side of the screen, which will take you to the Welcome page. Then click on \"Sign up Now\" on the right side of the page.     You will be asked to enter the access code listed below, as well as some personal information. Please follow the directions to create your username and password.     Your access code is: [email or web address]  Expires: 3/7/2018  2:51 PM     Your access code will  in 90 days. If you need help or a new code, please call your [place] clinic or " [phone].        Care EveryWhere ID     This is your Care EveryWhere ID. This could be used by other organizations to access your [place] medical records  [id number]         Blood Pressure from Last 3 Encounters:   02/18/05 110/72    Weight from Last 3 Encounters:   02/18/05 63.5 kg (140 lb)              We Performed the Following     FLORES PROGRESS NOTES REPORT     NEUROMUSCULAR RE-EDUCATION     THERAPEUTIC EXERCISES        Primary Care Provider Fax #    Physician No Ref-Primary [phone]       No address on file        Equal Access to Services     BRAVO IBRAHIM : Hadii aad ku hadasho Soomaali, waaxda luqadaha, qaybta kaalmada adeegyada, waxkim vitalin hayaan tata dennison . So [facility] [phone].    ATENCIÓN: Si habla español, tiene a hancock disposición servicios gratuitos de asistencia lingüística. Llame al [phone].    We comply with applicable federal civil rights laws and [place] laws. We do not discriminate on the basis of race, color, national origin, age, disability, sex, sexual orientation, or gender identity.            Thank you!     Thank you for choosing [facility] PHYSICAL THERAPY  for your care. Our goal is always to provide you with excellent care. Hearing back from our patients is one way we can continue to improve our services. Please take a few minutes to complete the written survey that you may receive in the mail after your visit with us. Thank you!             Your Updated Medication List - Protect others around you: Learn how to safely use, store and throw away your medicines at www.disposemymeds.org.          This list is accurate as of: 12/21/17 10:20 AM.  Always use your most recent med list.                   Brand Name Dispense Instructions for use Diagnosis    CALCIUM 500 + D 500-200 MG-IU Tabs           METROGEL-VAGINAL 0.75 % vaginal gel   Generic drug:  metroNIDAZOLE     qs    1 applicator BID x 5 days    Vaginitis and vulvovaginitis,  unspecified

## 2017-12-21 NOTE — LETTER
[facility] ATHLETIC [facility] PHYSICAL [facility]  [address]  [facility] [address] [phone]    2017    Re: Mary Waters   :   1945  MRN:  [id number]   REFERRING PHYSICIAN:   Sam Lanier MD    [facility] ATHLETIC [facility] PHYSICAL [facility]    Date of Initial Evaluation:  2017  Visits:  Rxs Used: 3  Reason for Referral:  Bilateral low back pain with right-sided sciatica, unspecified chronicity    PROGRESS  REPORT    Progress reporting period is from 17 to 17.       SUBJECTIVE  Subjective changes noted by patient:  Doing pretty good.  Is trying to get in shape for all the walking she will do in [place], so she did sidestepping with theraband around her lower legs for 300 reps.  After that she felt a mild soreness at lateral calf, but it went away.  Does still get pain at lateral R hip and into groin, but can't remember what causes it.  Will try to keep a journal.  Leaves soon for [employer] and will return the last week of .     Current Pain level:  (0-2/10).      Initial Pain level: 2/10.   Changes in function:  None  Adverse reaction to treatment or activity: None    OBJECTIVE  Changes noted in objective findings:    Lumbar ROM: flex to floor, ext 75%+/-, B SG WNL.    Pain in groin with R hip ER and end range flexion.     Abdominal strength 4/5 with towel pulled out at 45 degrees.  Still stands with ant pelvic tilt    ASSESSMENT/PLAN  Updated problem list and treatment plan: Diagnosis 1:  R lumbar radiculopathy  Pain -  self management, education and home program  Decreased ROM/flexibility - manual therapy, therapeutic exercise and home program  Decreased strength - therapeutic exercise, therapeutic activities and home program  Impaired muscle performance - neuro re-education and home program  Decreased function - therapeutic activities and home program  Impaired posture - neuro re-education and home program  STG/LTGs have been met or  progress has been made towards goals:  None  Assessment of Progress: The patient's condition has potential to improve.  Self Management Plans:  Patient has been instructed in a home treatment program.  Patient  has been instructed in self management of symptoms.  I have re-evaluated this patient and find that the nature, scope, duration and intensity of the therapy is appropriate for the medical condition of the patient.  Mary continues to require the following intervention to meet STG and LTG's:  PT    Recommendations:  This patient would benefit from continued therapy.     Frequency:  2 X a month, once daily  Duration:  for 2 months, once she returns from [facility].              Thank you for your referral.    INQUIRIES  Therapist: Pallavi Ruiz [facility]  [facility]ICAL [facility]ERAP[facility]  [address] [place], [place] [address]or [facility] [address]  [facility] [address]  Phone: [phone]  Fax: [phone]

## 2017-12-21 NOTE — PROGRESS NOTES
[place] [facility] Evaluation  Subjective:    HPI                    Objective:    System    Physical Exam    General     ROS    Assessment/Plan:      DISCHARGE  REPORT    Progress reporting period is from 12/2/17 to 12/21/17.       SUBJECTIVE  Subjective changes noted by patient:  Doing pretty good.  Is trying to get in shape for all the walking she will do in [place], so she did sidestepping with theraband around her lower legs for 300 reps.  After that she felt a mild soreness at lateral calf, but it went away.  Does still get pain at lateral R hip and into groin, but can't remember what causes it.  Will try to keep a journal.  Leaves soon for [employer] and will return the last week of Jan.     Current Pain level:  (0-2/10).      Initial Pain level: 2/10.   Changes in function:  None  Adverse reaction to treatment or activity: None    OBJECTIVE  Changes noted in objective findings:      Lumbar ROM: flex to floor, ext 75%+/-, B SG WNL.    Pain in groin with R hip ER and end range flexion.     Abdominal strength 4/5 with towel pulled out at 45 degrees.  Still stands with ant pelvic tilt    ASSESSMENT/PLAN  Updated problem list and treatment plan: Diagnosis 1:  R lumbar radiculopathy  Pain -  self management, education and home program  Decreased ROM/flexibility - manual therapy, therapeutic exercise and home program  Decreased strength - therapeutic exercise, therapeutic activities and home program  Impaired muscle performance - neuro re-education and home program  Decreased function - therapeutic activities and home program  Impaired posture - neuro re-education and home program  STG/LTGs have been met or progress has been made towards goals:  None  Assessment of Progress: The patient's condition has potential to improve.  Self Management Plans:  Patient has been instructed in a home treatment program.  Patient  has been instructed in self management of symptoms.  I have re-evaluated this patient and find  that the nature, scope, duration and intensity of the therapy is appropriate for the medical condition of the patient.  Mary continues to require the following intervention to meet STG and LTG's:  PT    Recommendations:  This patient would benefit from continued therapy.     Frequency:  2 X a month, once daily  Duration:  for 2 months, once she returns from [facility].    However, this patient has not returned in over 3 months, so plan to DC.          Please refer to the daily flowsheet for treatment today, total treatment time and time spent performing 1:1 timed codes.

## 2018-04-03 PROBLEM — M54.41 BILATERAL LOW BACK PAIN WITH RIGHT-SIDED SCIATICA, UNSPECIFIED CHRONICITY: Status: RESOLVED | Noted: 2017-12-02 | Resolved: 2018-04-03

## 2018-09-04 ENCOUNTER — THERAPY VISIT (OUTPATIENT)
Dept: PHYSICAL THERAPY | Facility: CLINIC | Age: 73
End: 2018-09-04
Payer: MEDICARE

## 2018-09-04 DIAGNOSIS — M50.30 DDD (DEGENERATIVE DISC DISEASE), CERVICAL: Primary | ICD-10-CM

## 2018-09-04 PROCEDURE — G8979 MOBILITY GOAL STATUS: HCPCS | Mod: GP | Performed by: PHYSICAL THERAPIST

## 2018-09-04 PROCEDURE — 97161 PT EVAL LOW COMPLEX 20 MIN: CPT | Mod: GP | Performed by: PHYSICAL THERAPIST

## 2018-09-04 PROCEDURE — G8978 MOBILITY CURRENT STATUS: HCPCS | Mod: GP | Performed by: PHYSICAL THERAPIST

## 2018-09-04 PROCEDURE — 97110 THERAPEUTIC EXERCISES: CPT | Mod: GP | Performed by: PHYSICAL THERAPIST

## 2018-09-04 PROCEDURE — 97140 MANUAL THERAPY 1/> REGIONS: CPT | Mod: GP | Performed by: PHYSICAL THERAPIST

## 2018-09-04 NOTE — MR AVS SNAPSHOT
After Visit Summary   9/4/2018    Mary Waters    MRN: [id number]           Patient Information     Date Of Birth          1945        Visit Information        Provider Department      9/4/2018 9:00 AM Pallavi Ruiz, PT [place] [facility]        Today's Diagnoses     DDD (degenerative disc disease), cervical    -  1       Follow-ups after your visit        Your next 10 appointments already scheduled     Sep 11, 2018  9:40 AM CDT   FLORES Extremity with Pallavi Ruiz PT   [facility] ([facility]  )    [address]  [facility] [address]   [phone]            Sep 18, 2018 10:20 AM CDT   FLORES Extremity with Pallavi Ruiz PT   [facility] ([facility] [place]  )    [address]  [facility] [address]   [phone]            Sep 25, 2018  9:40 AM CDT   FLORES Extremity with Pallavi Ruiz PT   [facility] ([place]  )    [address]  [facility] [address]   [phone]              Who to contact     If you have questions or need follow up information about today's clinic visit or your schedule please contact [place] [facility] PHYSICAL THERAPY directly at [phone].  Normal or non-critical lab and imaging results will be communicated to you by MyChart, letter or phone within 4 business days after the clinic has received the results. If you do not hear from us within 7 days, please contact the clinic through [employer] or phone. If you have a critical or abnormal lab result, we will notify you by phone as soon as possible.  Submit refill requests through LocalS[employer] or call your pharmacy and they will forward the refill request to us. Please allow 3 business days for your refill to be completed.          Additional Information About Your Visit        MyChart Information      [employer] gives you secure access to your electronic health record. If you see a primary care provider, you can also send messages to your care team and make appointments. If you have questions, please call your primary care clinic.  If you do not have a primary care provider, please call [phone] and they will assist you.        Care EveryWhere ID     This is your Care EveryWhere ID. This could be used by other organizations to access your [facility] medical records  [id number]         Blood Pressure from Last 3 Encounters:   02/18/05 110/72    Weight from Last 3 Encounters:   02/18/05 63.5 kg (140 lb)              We Performed the Following     HC PT EVAL, LOW COMPLEXITY     FLORES CERT REPORT     FLORES INITIAL EVAL REPORT     MANUAL THER TECH,1+REGIONS,EA 15 MIN     THERAPEUTIC EXERCISES        Primary Care Provider Fax #    Physician No Ref-Primary [phone]       No address on file        Equal Access to Services     BRAVO IBRAHIM : Hadii puja seayo Soelisabet, waaxda luqadaha, qaybta kaalmada lg, alonso dennison . So [facility] [phone].    ATENCIÓN: Si habla español, tiene a hancock disposición servicios gratuitos de asistencia lingüística. Llame al [phone].    We comply with applicable federal civil rights laws and [place] laws. We do not discriminate on the basis of race, color, national origin, age, disability, sex, sexual orientation, or gender identity.            Thank you!     Thank you for choosing [facility]  for your care. Our goal is always to provide you with excellent care. Hearing back from our patients is one way we can continue to improve our services. Please take a few minutes to complete the written survey that you may receive in the mail after your visit with us. Thank you!             Your Updated Medication List - Protect others around you: Learn how to safely use, store and throw away your medicines at  www.disposemymeds.org.          This list is accurate as of 9/4/18 12:30 PM.  Always use your most recent med list.                   Brand Name Dispense Instructions for use Diagnosis    CALCIUM 500 + D 500-200 MG-IU Tabs           METROGEL-VAGINAL 0.75 % vaginal gel   Generic drug:  metroNIDAZOLE     qs    1 applicator BID x 5 days    Vaginitis and vulvovaginitis, unspecified

## 2018-09-04 NOTE — PROGRESS NOTES
[facility] Initial Evaluation  Subjective:  Patient is a 73 year old female presenting with rehab left ankle/foot hpi.                                    General health as reported by patient is good.  Pertinent medical history includes:  None.  Medical allergies: yes (anti biotics).  Other surgeries include:  None reported.  Current medications:  None as reported by patient.  Current occupation is Retired!.        Barriers include:  None as reported by patient.    Red flags:  None as reported by patient.                        Objective:  System    Physical Exam    General     ROS    Assessment/Plan:

## 2018-09-04 NOTE — LETTER
DEPARTMENT OF HEALTH AND HUMAN SERVICES  CENTERS FOR MEDICARE & MEDICAID SERVICES    PLAN/UPDATED PLAN OF PROGRESS FOR OUTPATIENT REHABILITATION    PATIENTS NAME:  Mary Waters   : 1945  PROVIDER NUMBER:    [id number]  [id number]   PROVIDER NAME: [place] [facility] PHYSICAL THERAPY  MEDICAL RECORD NUMBER: [id number]   START OF CARE DATE:  SOC Date: 18   TYPE:  PT  PRIMARY/TREATMENT DIAGNOSIS: (Pertinent Medical Diagnosis)  DDD (degenerative disc disease), cervical    VISITS FROM START OF CARE:  Rxs Used: 1     [place] [facility] Initial Evaluation  Subjective:  Patient is a 73 year old female presenting with rehab cervical spine hpi.   This is a recurrent condition  Patient reported she started noting R shoulder/neck pain during yoga in early Aug 2018.  She saw MD on 18 and got PT orders.  .    Patient reports pain:  Cervical right side.  Radiates to:  Shoulder right.  Pain is described as aching and is intermittent and reported as 3/10.  Associated symptoms:  Tingling. Pain is worse during the day.  Exacerbated by: yoga, reaching back, after lots of drumming. and relieved by rest.  Since onset symptoms are unchanged.    Previous treatment includes physical therapy.  There was significant improvement following previous treatment.  General health as reported by patient is good.                General health as reported by patient is good.  Pertinent medical history includes:  None.  Medical allergies: yes (anti biotics).  Other surgeries include:  None reported.  Current medications:  None as reported by patient.  Current occupation is Retired!.      Barriers include:  None as reported by patient.  Red flags:  None as reported by patient.  Objective:  Standing Alignment:    Shoulder/UE:  Rounded shoulders  Cervical/Thoracic Evaluation  AROM:  AROM Cervical:  Flexion:            60 stiff  Extension:       30+  Rotation:         Left: wfl     Right: wfl  Side  Bend:      Left: 27 tight     Right:  38  Strength: repeated ret/ext caused tingling down R arm after 3rd repetition.  Headaches: none    Cervical Myotomes:    C1-2 (Neck Flex): Left:  5-    Right: 5-  C3 (neck side bend): Left: 5    Right: 5  C4 (shrug):  Left: 5    Right: 5  C5 (Deltoid):  Left: 5    Right: 5  C6 (Biceps):  Left: 5    Right: 5  C7 (Triceps):  Left: 5    Right: 5  C8 (Thumb Ext): Left: 5    Right: 5-  T1 (Intrinsics): Left: 5    Right: 5        Cervical Palpation:  : tender R infraspinatous.  Tenderness present at Right:    Scalenes; Upper Trap and Levator  Shoulder Evaluation:  ROM:  AROM:  normal  Strength:  normal      Assessment/Plan:    Patient is a 73 year old female with cervical complaints.    Patient has the following significant findings with corresponding treatment plan.                Diagnosis 1:  Right Cervical radiculopathy with DDD  Pain -  manual therapy, self management, education and home program  Decreased ROM/flexibility - manual therapy, therapeutic exercise and home program  Impaired muscle performance - neuro re-education and home program  Decreased function - therapeutic activities and home program  Therapy Evaluation Codes:   1) History comprised of:   Personal factors that impact the plan of care:      None.    Comorbidity factors that impact the plan of care are:      None.     Medications impacting care: None.  2) Examination of Body Systems comprised of:   Body structures and functions that impact the plan of care:      Cervical spine.   Activity limitations that impact the plan of care are:      Lifting, Sports and Sleeping.  3) Clinical presentation characteristics are:   Stable/Uncomplicated.  4) Decision-Making    Low complexity using standardized patient assessment instrument and/or measureable assessment of functional outcome.  Cumulative Therapy Evaluation is: Low complexity.  Previous and current functional limitations:  (See Goal Flow Sheet for this  "information)    Short term and Long term goals: (See Goal Flow Sheet for this information)   Communication ability:  Patient appears to be able to clearly communicate and understand verbal and written communication and follow directions correctly.  Treatment Explanation - The following has been discussed with the patient:   RX ordered/plan of care  Anticipated outcomes  Possible risks and side effects  This patient would benefit from PT intervention to resume normal activities.   Rehab potential is excellent.  Frequency:  1 X week, once daily  Duration:  for 4 weeks tapering to 2 X a month over 4 weeks  Discharge Plan:  Achieve all LTG.  Independent in home treatment program.  Reach maximal therapeutic benefit.          Caregiver Signature/Credentials _____________________________ Date ________       Treating Provider: joann Avery   I have reviewed and certified the need for these services and plan of treatment while under my care.        PHYSICIAN'S SIGNATURE:   _________________________________________  Date___________   Leonardo Peres MD    Certification period:  Beginning of Cert date period: 09/04/18 to  End of Cert period date: 12/02/18     Functional Level Progress Report: Please see attached \"Goal Flow sheet for Functional level.\"    ____X____ Continue Services or       ________ DC Services                Service dates: From  SOC Date: 09/04/18 date to present                         "

## 2018-09-04 NOTE — PROGRESS NOTES
[place] [facility] Initial Evaluation  Subjective:  Patient is a 73 year old female presenting with rehab cervical spine hpi.         This is a recurrent condition  Patient reported she started noting R shoulder/neck pain during yoga in early Aug 2018.  She saw MD on 8/28/18 and got PT orders.  .    Patient reports pain:  Cervical right side.  Radiates to:  Shoulder right.  Pain is described as aching and is intermittent and reported as 3/10.  Associated symptoms:  Tingling. Pain is worse during the day.  Exacerbated by: yoga, reaching back, after lots of drumming. and relieved by rest.  Since onset symptoms are unchanged.    Previous treatment includes physical therapy.  There was significant improvement following previous treatment.  General health as reported by patient is good.                                              Objective:  Standing Alignment:      Shoulder/UE:  Rounded shoulders                                  Cervical/Thoracic Evaluation    AROM:  AROM Cervical:    Flexion:            60 stiff  Extension:       30+  Rotation:         Left: wfl     Right: wfl  Side Bend:      Left: 27 tight     Right:  38    Strength: repeated ret/ext caused tingling down R arm after 3rd repetition.  Headaches: none  Cervical Myotomes:    C1-2 (Neck Flex): Left:  5-    Right: 5-  C3 (neck side bend): Left: 5    Right: 5  C4 (shrug):  Left: 5    Right: 5  C5 (Deltoid):  Left: 5    Right: 5  C6 (Biceps):  Left: 5    Right: 5  C7 (Triceps):  Left: 5    Right: 5  C8 (Thumb Ext): Left: 5    Right: 5-  T1 (Intrinsics): Left: 5    Right: 5        Cervical Palpation:  : tender R infraspinatous.    Tenderness present at Right:    Scalenes; Upper Trap and Levator               Shoulder Evaluation:  ROM:  AROM:  normal                                  Strength:  normal                                                               General     ROS    Assessment/Plan:    Patient is a 73 year old female with cervical  complaints.    Patient has the following significant findings with corresponding treatment plan.                Diagnosis 1:  Right Cervical radiculopathy with DDD  Pain -  manual therapy, self management, education and home program  Decreased ROM/flexibility - manual therapy, therapeutic exercise and home program  Impaired muscle performance - neuro re-education and home program  Decreased function - therapeutic activities and home program    Therapy Evaluation Codes:   1) History comprised of:   Personal factors that impact the plan of care:      None.    Comorbidity factors that impact the plan of care are:      None.     Medications impacting care: None.  2) Examination of Body Systems comprised of:   Body structures and functions that impact the plan of care:      Cervical spine.   Activity limitations that impact the plan of care are:      Lifting, Sports and Sleeping.  3) Clinical presentation characteristics are:   Stable/Uncomplicated.  4) Decision-Making    Low complexity using standardized patient assessment instrument and/or measureable assessment of functional outcome.  Cumulative Therapy Evaluation is: Low complexity.    Previous and current functional limitations:  (See Goal Flow Sheet for this information)    Short term and Long term goals: (See Goal Flow Sheet for this information)     Communication ability:  Patient appears to be able to clearly communicate and understand verbal and written communication and follow directions correctly.  Treatment Explanation - The following has been discussed with the patient:   RX ordered/plan of care  Anticipated outcomes  Possible risks and side effects  This patient would benefit from PT intervention to resume normal activities.   Rehab potential is excellent.    Frequency:  1 X week, once daily  Duration:  for 4 weeks tapering to 2 X a month over 4 weeks  Discharge Plan:  Achieve all LTG.  Independent in home treatment program.  Reach maximal therapeutic  benefit.    Please refer to the daily flowsheet for treatment today, total treatment time and time spent performing 1:1 timed codes.

## 2018-09-11 ENCOUNTER — THERAPY VISIT (OUTPATIENT)
Dept: PHYSICAL THERAPY | Facility: CLINIC | Age: 73
End: 2018-09-11
Payer: MEDICARE

## 2018-09-11 DIAGNOSIS — M50.30 DDD (DEGENERATIVE DISC DISEASE), CERVICAL: ICD-10-CM

## 2018-09-11 PROCEDURE — 97110 THERAPEUTIC EXERCISES: CPT | Mod: GP | Performed by: PHYSICAL THERAPIST

## 2018-09-11 PROCEDURE — 97140 MANUAL THERAPY 1/> REGIONS: CPT | Mod: GP | Performed by: PHYSICAL THERAPIST

## 2018-09-18 ENCOUNTER — THERAPY VISIT (OUTPATIENT)
Dept: PHYSICAL THERAPY | Facility: CLINIC | Age: 73
End: 2018-09-18
Payer: MEDICARE

## 2018-09-18 DIAGNOSIS — M50.30 DDD (DEGENERATIVE DISC DISEASE), CERVICAL: ICD-10-CM

## 2018-09-18 PROCEDURE — 97110 THERAPEUTIC EXERCISES: CPT | Mod: GP | Performed by: PHYSICAL THERAPIST

## 2018-09-18 PROCEDURE — 97140 MANUAL THERAPY 1/> REGIONS: CPT | Mod: GP | Performed by: PHYSICAL THERAPIST

## 2018-09-25 ENCOUNTER — THERAPY VISIT (OUTPATIENT)
Dept: PHYSICAL THERAPY | Facility: CLINIC | Age: 73
End: 2018-09-25
Payer: MEDICARE

## 2018-09-25 DIAGNOSIS — M50.30 DDD (DEGENERATIVE DISC DISEASE), CERVICAL: ICD-10-CM

## 2018-09-25 PROCEDURE — 97110 THERAPEUTIC EXERCISES: CPT | Mod: GP | Performed by: PHYSICAL THERAPIST

## 2018-09-25 PROCEDURE — 97140 MANUAL THERAPY 1/> REGIONS: CPT | Mod: GP | Performed by: PHYSICAL THERAPIST

## 2018-09-25 NOTE — MR AVS SNAPSHOT
After Visit Summary   9/25/2018    Mary Waters    MRN: [id number]           Patient Information     Date Of Birth          1945        Visit Information        Provider Department      9/25/2018 9:40 AM Pallavi Ruiz PT [facility]        Today's Diagnoses     DDD (degenerative disc disease), cervical           Follow-ups after your visit        Who to contact     If you have questions or need follow up information about today's clinic visit or your schedule please contact [facility] ATHLET[facility] PHYSICAL THERAPY directly at [phone].  Normal or non-critical lab and imaging results will be communicated to you by VIPTALONhart, letter or phone within 4 business days after the clinic has received the results. If you do not hear from us within 7 days, please contact the clinic through [employer] or phone. If you have a critical or abnormal lab result, we will notify you by phone as soon as possible.  Submit refill requests through [employer]Noise or call your pharmacy and they will forward the refill request to us. Please allow 3 business days for your refill to be completed.          Additional Information About Your Visit        MyChart Information     [employer] gives you secure access to your electronic health record. If you see a primary care provider, you can also send messages to your care team and make appointments. If you have questions, please call your primary care clinic.  If you do not have a primary care provider, please call [phone] and they will assist you.        Care EveryWhere ID     This is your Care EveryWhere ID. This could be used by other organizations to access your [place] medical records  [id number]         Blood Pressure from Last 3 Encounters:   02/18/05 110/72    Weight from Last 3 Encounters:   02/18/05 63.5 kg (140 lb)              We Performed the Following     FLORES PROGRESS NOTES REPORT     MANUAL THER  TECH,1+REGIONS,EA 15 MIN     THERAPEUTIC EXERCISES        Primary Care Provider Fax #    Physician No Ref-Primary [phone]       No address on file        Equal Access to Services     BRAVO IBRAHIM : Hadii aad ku hadjenna Krishnan, alton blasabelardo, herrera castanon, alonso torresjosselin chandler. So [facility] [phone].    ATENCIÓN: Si habla español, tiene a hancock disposición servicios gratuitos de asistencia lingüística. Llame al [phone].    We comply with applicable federal civil rights laws and [place] laws. We do not discriminate on the basis of race, color, national origin, age, disability, sex, sexual orientation, or gender identity.            Thank you!     Thank you for choosing [place] [facility]IC[facility] PHYSICAL THERAPY  for your care. Our goal is always to provide you with excellent care. Hearing back from our patients is one way we can continue to improve our services. Please take a few minutes to complete the written survey that you may receive in the mail after your visit with us. Thank you!             Your Updated Medication List - Protect others around you: Learn how to safely use, store and throw away your medicines at www.disposemymeds.org.          This list is accurate as of 9/25/18 10:20 AM.  Always use your most recent med list.                   Brand Name Dispense Instructions for use Diagnosis    CALCIUM 500 + D 500-200 MG-IU Tabs           METROGEL-VAGINAL 0.75 % vaginal gel   Generic drug:  metroNIDAZOLE     qs    1 applicator BID x 5 days    Vaginitis and vulvovaginitis, unspecified

## 2018-09-25 NOTE — PROGRESS NOTES
Subjective:  HPI                    Objective:  System    Physical Exam    General     ROS    Assessment/Plan:    DISCHARGE REPORT    Progress reporting period is from 9/4/18 to 9/25/18.       SUBJECTIVE  Subjective changes noted by patient:   Much less ocassions of tingling down R arm now.  Only if her neck is extended and she limits this now. Can sleep without limitation      Current Pain level:  (0-2/10).      Initial Pain level: 3/10.   Changes in function:  Yes (See Goal flowsheet attached for changes in current functional level)  Adverse reaction to treatment or activity: None    OBJECTIVE  Changes noted in objective findings:  Yes,     Cervical ROM: Flex 100%, ext 75%+, B SB and rot 100% and no pain.    Hypertonic R>L UT.     ASSESSMENT/PLAN  Updated problem list and treatment plan: Diagnosis 1:  R cervical radiculopathy  Decreased ROM/flexibility - manual therapy, therapeutic exercise and home program  STG/LTGs have been met or progress has been made towards goals:  Yes (See Goal flow sheet completed today.)  Assessment of Progress: The patient's condition is improving.  Self Management Plans:  Patient is independent in a home treatment program.  Patient is independent in self management of symptoms.    Mary continues to require the following intervention to meet STG and LTG's:  PT intervention is no longer required to meet STG/LTG.    Recommendations:  This patient is ready to be discharged from therapy and continue their home treatment program.    Please refer to the daily flowsheet for treatment today, total treatment time and time spent performing 1:1 timed codes.

## 2018-09-25 NOTE — LETTER
[facility] ATHLETIC [facility] PHYSICAL [facility]  [address]  [place] [id number] [phone]    2018    Re: Mary Doi   :   1945  MRN:  [id number]   REFERRING PHYSICIAN:   Leonardo Peres    [facility] ATHLETIC [facility] PHYSICAL [facility]    Date of Initial Evaluation:  18  Visits:  Rxs Used: 4  Reason for Referral:  DDD (degenerative disc disease), cervical    DISCHARGE REPORT  Progress reporting period is from 18 to 18.       SUBJECTIVE  Subjective changes noted by patient:   Much less ocassions of tingling down R arm now. Only if her neck is extended and she limits this now. Can sleep without limitation     Current Pain level:  (0-2/10).     Initial Pain level: 3/10.   Changes in function:  Yes (See Goal flowsheet attached for changes in current functional level)  Adverse reaction to treatment or activity: None    OBJECTIVE  Changes noted in objective findings:  Yes,     Cervical ROM: Flex 100%, ext 75%+, B SB and rot 100% and no pain.    Hypertonic R>L UT.     ASSESSMENT/PLAN  Updated problem list and treatment plan: Diagnosis 1:  R cervical radiculopathy  Decreased ROM/flexibility - manual therapy, therapeutic exercise and home program  STG/LTGs have been met or progress has been made towards goals:  Yes (See Goal flow sheet completed today.)  Assessment of Progress: The patient's condition is improving.  Self Management Plans:  Patient is independent in a home treatment program.  Patient is independent in self management of symptoms.    Mary continues to require the following intervention to meet STG and LTG's:  PT intervention is no longer required to meet STG/LTG.                Re: Mary Doi   :   1945    Recommendations:  This patient is ready to be discharged from therapy and continue their home treatment program.    Thank you for your referral.    INQUIRIES  [facility]  - TO PHYSICAL THERAPY  [address]  [place] [address]  Phone: [phone]  Fax: [phone]

## 2019-07-09 ENCOUNTER — THERAPY VISIT (OUTPATIENT)
Dept: PHYSICAL THERAPY | Facility: CLINIC | Age: 74
End: 2019-07-09
Payer: COMMERCIAL

## 2019-07-09 DIAGNOSIS — M54.12 LEFT CERVICAL RADICULOPATHY: ICD-10-CM

## 2019-07-09 PROCEDURE — 97161 PT EVAL LOW COMPLEX 20 MIN: CPT | Mod: GP | Performed by: PHYSICAL THERAPIST

## 2019-07-09 PROCEDURE — 97140 MANUAL THERAPY 1/> REGIONS: CPT | Mod: GP | Performed by: PHYSICAL THERAPIST

## 2019-07-09 PROCEDURE — 97110 THERAPEUTIC EXERCISES: CPT | Mod: GP | Performed by: PHYSICAL THERAPIST

## 2019-07-09 NOTE — LETTER
[facility] ATHLETIC [facility] PHYSICAL [facility]  [address]  [place] [address] [phone]    2019    Re: Mary Waters   :   1945  MRN:  [id number]   REFERRING PHYSICIAN:   Sheng Troy    [facility] ATHLETIC [facility] PHYSICAL [facility]  Date of Initial Evaluation: 2019  Visits:  Rxs Used: 1  Reason for Referral:  Left cervical radiculopathy    EVALUATION SUMMARY    [facility]  Subjective:  The history is provided by the patient. The history is limited by a language barrier. No  was used.   Mary Waters being seen for L neck and down to elbow.   Date of Onset: Started at the end of May and got PT orders from MD on 19. Where condition occurred: for unknown reasons.Problem occurred: Not sure, but has been doing Planks every day since 2018 and is up to 5 minutes.  Also lifted and carried some heavy stones she had at home  Pain score: 4-8/10. General health as reported by patient is good. Pertinent medical history includes:  None.    Surgeries include:  None.  Current medications:  None.   Primary job tasks: Taking english class 5 days/wk 3-4 hours each.  Pain is described as aching, burning and shooting and is intermittent. Pain is worse during the day. Since onset symptoms are gradually worsening.      Patient is Retired.       Type of problem:  Cervical spine   Condition occurred with:  Repetition/overuse. This is a new condition    Patient reports pain:  Cervical left side. Radiates to:  Shoulder left, upper arm left and elbow left. Associated symptoms:  Tingling. Symptoms are exacerbated by carrying, lifting, sitting and stress (lying on L) and relieved by heat.                    Objective:  Standing Alignment:    Cervical/Thoracic:  Forward head  Shoulder/UE:  Rounded shoulders    Cervical/Thoracic Evaluation  Arom wnl cervical: Repeated retraction had no effect today.     AROM:  AROM  Cervical:  Flexion:            WNL(increases pain)  Extension:       WNL(increased pain)  Rotation:         Left: WNL (increased pain)     Right: WNL(no pain)  Side Bend:      Left: 75%(same pain)     Right:  75% (more pain)  Re: Mary Jt   :   1945    Headaches: none  Cervical Myotomes:  normal  Cervical Palpation:    Tenderness present at Left:    Upper Trap and Levator  Cord Sign:  Cord signs: L arm pain eliminated during manual cervical traction.    Assessment/Plan:    Patient is a 74 year old female with left side shoulder complaints.    Patient has the following significant findings with corresponding treatment plan.                Diagnosis 1:  L cervical radiculopathy  Pain -  hot/cold therapy, electric stimulation, manual therapy, self management, education, directional preference exercise and home program  Decreased strength - therapeutic exercise, therapeutic activities and home program  Impaired muscle performance - neuro re-education and home program  Decreased function - therapeutic activities and home program  Impaired posture - neuro re-education and home program    Therapy Evaluation Codes:   1) History comprised of:   Personal factors that impact the plan of care:      None.    Comorbidity factors that impact the plan of care are:      None.     Medications impacting care: None.  2) Examination of Body Systems comprised of:   Body structures and functions that impact the plan of care:      Cervical spine.   Activity limitations that impact the plan of care are:      Driving, Lifting, Reading/Computer work, Sitting and Sleeping.  3) Clinical presentation characteristics are:   Evolving/Changing.  4) Decision-Making    Low complexity using standardized patient assessment instrument and/or measureable assessment of functional outcome.  Cumulative Therapy Evaluation is: Low complexity.    Previous and current functional limitations:  (See Goal Flow Sheet for this information)    Short term and  Long term goals: (See Goal Flow Sheet for this information)     Communication ability:  Patient appears to be able to clearly communicate and understand verbal and written communication and follow directions correctly.  Treatment Explanation - The following has been discussed with the patient:   RX ordered/plan of care  Anticipated outcomes  Possible risks and side effects  This patient would benefit from PT intervention to resume normal activities.   Rehab potential is excellent.        Re: Mary Jt   :   1945    Frequency:  2 X week, once daily  Duration:  for 2 weeks tapering to 1 X a week over 8 weeks  Discharge Plan:  Achieve all LTG.  Independent in home treatment program.  Reach maximal therapeutic benefit.    Thank you for your referral.    INQUIRIES  Therapist: Pallavi Ruiz [facility]  [facility] - [facility] PHYSICAL THERAPY  [address] [place] [address] [address]  [facility] [address]  Phone: [phone]  Fax: [phone]

## 2019-07-09 NOTE — PROGRESS NOTES
[facility] Initial Evaluation  Subjective:  The history is provided by the patient. No  was used.   Mary Waters being seen for left shoulder and arm pain.   Date of Onset: end of May. Problem occurred: possibly too much exercise  and reported as 4/10 on pain scale. General health as reported by patient is good. Pertinent medical history includes:  None.  Medical allergies: anti biotic.  Surgeries include:  None.  Current medications:  None.              Patient is retired.                           Objective:  System    Physical Exam    General     ROS    Assessment/Plan:

## 2019-07-09 NOTE — PROGRESS NOTES
[facility] Initial Evaluation  Subjective:  The history is provided by the patient. The history is limited by a language barrier. No  was used.   Mary Waters being seen for L neck and down to elbow.   Date of Onset: Started at the end of May and got PT orders from MD on 6/19/19. Where condition occurred: for unknown reasons.Problem occurred: Not sure, but has been doing Planks every day since Sept 2018 and is up to 5 minutes.  Also lifted and carried some heavy stones she had at home  Pain score: 4-8/10. General health as reported by patient is good. Pertinent medical history includes:  None.    Surgeries include:  None.  Current medications:  None.   Primary job tasks: Taking english class 5 days/wk 3-4 hours each.  Pain is described as aching, burning and shooting and is intermittent. Pain is worse during the day. Since onset symptoms are gradually worsening.      Patient is Retired.       Type of problem:  Cervical spine   Condition occurred with:  Repetition/overuse. This is a new condition    Patient reports pain:  Cervical left side. Radiates to:  Shoulder left, upper arm left and elbow left. Associated symptoms:  Tingling. Symptoms are exacerbated by carrying, lifting, sitting and stress (lying on L) and relieved by heat.                      Objective:  Standing Alignment:    Cervical/Thoracic:  Forward head  Shoulder/UE:  Rounded shoulders                                  Cervical/Thoracic Evaluation  Arom wnl cervical: Repeated retraction had no effect today.     AROM:  AROM Cervical:    Flexion:            WNL(increases pain)  Extension:       WNL(increased pain)  Rotation:         Left: WNL (increased pain)     Right: WNL(no pain)  Side Bend:      Left: 75%(same pain)     Right:  75% (more pain)      Headaches: none  Cervical Myotomes:  normal                        Cervical Palpation:    Tenderness present at Left:    Upper Trap and Levator          Cord Sign:   Cord signs: L arm pain eliminated during manual cervical traction.                                            General     ROS    Assessment/Plan:    Patient is a 74 year old female with left side shoulder complaints.    Patient has the following significant findings with corresponding treatment plan.                Diagnosis 1:  L cervical radiculopathy  Pain -  hot/cold therapy, electric stimulation, manual therapy, self management, education, directional preference exercise and home program  Decreased strength - therapeutic exercise, therapeutic activities and home program  Impaired muscle performance - neuro re-education and home program  Decreased function - therapeutic activities and home program  Impaired posture - neuro re-education and home program    Therapy Evaluation Codes:   1) History comprised of:   Personal factors that impact the plan of care:      None.    Comorbidity factors that impact the plan of care are:      None.     Medications impacting care: None.  2) Examination of Body Systems comprised of:   Body structures and functions that impact the plan of care:      Cervical spine.   Activity limitations that impact the plan of care are:      Driving, Lifting, Reading/Computer work, Sitting and Sleeping.  3) Clinical presentation characteristics are:   Evolving/Changing.  4) Decision-Making    Low complexity using standardized patient assessment instrument and/or measureable assessment of functional outcome.  Cumulative Therapy Evaluation is: Low complexity.    Previous and current functional limitations:  (See Goal Flow Sheet for this information)    Short term and Long term goals: (See Goal Flow Sheet for this information)     Communication ability:  Patient appears to be able to clearly communicate and understand verbal and written communication and follow directions correctly.  Treatment Explanation - The following has been discussed with the patient:   RX ordered/plan of care  Anticipated  outcomes  Possible risks and side effects  This patient would benefit from PT intervention to resume normal activities.   Rehab potential is excellent.    Frequency:  2 X week, once daily  Duration:  for 2 weeks tapering to 1 X a week over 8 weeks  Discharge Plan:  Achieve all LTG.  Independent in home treatment program.  Reach maximal therapeutic benefit.    Please refer to the daily flowsheet for treatment today, total treatment time and time spent performing 1:1 timed codes.

## 2019-07-11 ENCOUNTER — THERAPY VISIT (OUTPATIENT)
Dept: PHYSICAL THERAPY | Facility: CLINIC | Age: 74
End: 2019-07-11
Payer: COMMERCIAL

## 2019-07-11 DIAGNOSIS — M54.12 LEFT CERVICAL RADICULOPATHY: ICD-10-CM

## 2019-07-11 PROCEDURE — 97140 MANUAL THERAPY 1/> REGIONS: CPT | Mod: GP | Performed by: PHYSICAL THERAPIST

## 2019-07-11 PROCEDURE — 97110 THERAPEUTIC EXERCISES: CPT | Mod: GP | Performed by: PHYSICAL THERAPIST

## 2019-08-01 ENCOUNTER — THERAPY VISIT (OUTPATIENT)
Dept: PHYSICAL THERAPY | Facility: CLINIC | Age: 74
End: 2019-08-01
Payer: COMMERCIAL

## 2019-08-01 DIAGNOSIS — M54.12 LEFT CERVICAL RADICULOPATHY: ICD-10-CM

## 2019-08-01 PROCEDURE — 97110 THERAPEUTIC EXERCISES: CPT | Mod: GP | Performed by: PHYSICAL THERAPIST

## 2019-08-01 PROCEDURE — 97140 MANUAL THERAPY 1/> REGIONS: CPT | Mod: GP | Performed by: PHYSICAL THERAPIST

## 2019-08-01 PROCEDURE — 97112 NEUROMUSCULAR REEDUCATION: CPT | Mod: GP | Performed by: PHYSICAL THERAPIST

## 2019-08-15 ENCOUNTER — THERAPY VISIT (OUTPATIENT)
Dept: PHYSICAL THERAPY | Facility: CLINIC | Age: 74
End: 2019-08-15
Payer: COMMERCIAL

## 2019-08-15 DIAGNOSIS — M54.12 LEFT CERVICAL RADICULOPATHY: ICD-10-CM

## 2019-08-15 PROCEDURE — 97140 MANUAL THERAPY 1/> REGIONS: CPT | Mod: GP | Performed by: PHYSICAL THERAPIST

## 2019-08-15 PROCEDURE — 97110 THERAPEUTIC EXERCISES: CPT | Mod: GP | Performed by: PHYSICAL THERAPIST

## 2019-09-03 ENCOUNTER — THERAPY VISIT (OUTPATIENT)
Dept: PHYSICAL THERAPY | Facility: CLINIC | Age: 74
End: 2019-09-03
Payer: COMMERCIAL

## 2019-09-03 DIAGNOSIS — M54.12 LEFT CERVICAL RADICULOPATHY: ICD-10-CM

## 2019-09-03 PROCEDURE — 97140 MANUAL THERAPY 1/> REGIONS: CPT | Mod: GP | Performed by: PHYSICAL THERAPIST

## 2019-09-03 PROCEDURE — 97110 THERAPEUTIC EXERCISES: CPT | Mod: GP | Performed by: PHYSICAL THERAPIST

## 2019-09-03 NOTE — PROGRESS NOTES
Subjective:  HPI                    Objective:  System    Physical Exam    General     ROS    Assessment/Plan:    DISCHARGE  REPORT    Progress reporting period is from 7/9/19 to 9/3/19.       SUBJECTIVE  Subjective changes noted by patient:  Patient reports she is much better and has started back to some yoga.  Can sit through all of her classes now and just 3/10 medial scapula pain.       Current Pain level: (0-3/10).      Initial Pain level: (4-8/10).   Changes in function:  Yes (See Goal flowsheet attached for changes in current functional level)  Adverse reaction to treatment or activity: None    OBJECTIVE  Changes noted in objective findings:  Yes,     Cervical ROM: Flex WNL+/-, ext WNL, B Rot WNL and painfree, R SB WNL and pain free, L SB WNL+/-.      Tender L UT.    Hypomobile C6     Cervical myotomes all 5/5.    ASSESSMENT/PLAN  Updated problem list and treatment plan: Diagnosis 1:  L cervical radiculopathy  Pain -  manual therapy, self management, education, directional preference exercise and home program  Decreased ROM/flexibility - manual therapy, therapeutic exercise and home program  Decreased function - therapeutic activities and home program  STG/LTGs have been met or progress has been made towards goals:  Yes (See Goal flow sheet completed today.)  Assessment of Progress: The patient's condition is improving.  Self Management Plans:  Patient has been instructed in a home treatment program.  Patient  has been instructed in self management of symptoms.  I have re-evaluated this patient and find that the nature, scope, duration and intensity of the therapy is appropriate for the medical condition of the patient.  Mary continues to require the following intervention to meet STG and LTG's:  PT    Recommendations:  This patient would benefit from continued therapy.     Frequency:  1-2 X a month, once daily  Duration:  for 3 visits    However, patient canceled her next visit and hasn't returned in 6 wks,  so plan to discontinue with HEP      Please refer to the daily flowsheet for treatment today, total treatment time and time spent performing 1:1 timed codes.

## 2019-09-03 NOTE — LETTER
[facility] ATHLETIC [facility] PHYSICAL [facility]  [address]  [place] [address] [phone]    2019    Re: Mary Waters   :   1945  MRN:  [id number]   REFERRING PHYSICIAN:   Sheng Troy    [facility] ATHLETIC [facility] PHYSICAL [facility]    Date of Initial Evaluation:  19  Visits:  Rxs Used: 5  Reason for Referral:  Left cervical radiculopathy    PROGRESS  REPORT  Progress reporting period is from 19 to 9/3/19.       SUBJECTIVE  Subjective changes noted by patient:  Patient reports she is much better and has started back to some yoga.  Can sit through all of her classes now and just 3/10 medial scapula pain.  Current Pain level: (0-3/10).     Initial Pain level: (4-8/10).   Changes in function:  Yes (See Goal flowsheet attached for changes in current functional level)  Adverse reaction to treatment or activity: None    OBJECTIVE  Changes noted in objective findings:  Yes,   Cervical ROM: Flex WNL+/-, ext WNL, B Rot WNL and painfree, R SB WNL and pain free, L SB WNL+/-.    Tender L UT.    Hypomobile C6   Cervical myotomes all /.    ASSESSMENT/PLAN  Updated problem list and treatment plan: Diagnosis 1:  L cervical radiculopathy  Pain -  manual therapy, self management, education, directional preference exercise and home program  Decreased ROM/flexibility - manual therapy, therapeutic exercise and home program  Decreased function - therapeutic activities and home program  STG/LTGs have been met or progress has been made towards goals:  Yes (See Goal flow sheet completed today.)  Assessment of Progress: The patient's condition is improving.  Self Management Plans:  Patient has been instructed in a home treatment program.  Patient  has been instructed in self management of symptoms.  I have re-evaluated this patient and find that the nature, scope, duration and intensity of the therapy is appropriate for the medical condition of the patient.  Mary  continues to require the following intervention to meet STG and LTG's:  PT      Re: Mary Doi   :   1945    Recommendations:  This patient would benefit from continued therapy.     Frequency:  1-2 X a month, once daily  Duration:  for 3 visits    Thank you for your referral.    INQUIRIES  Therapist: Pallavi Ruiz [facility]   [facility] PHYSICAL THERAPY  [address] [place] [address] [address]  [facility] [address]  Phone: [phone]  Fax: [phone]

## 2019-09-28 ENCOUNTER — HEALTH MAINTENANCE LETTER (OUTPATIENT)
Age: 74
End: 2019-09-28

## 2019-10-24 PROBLEM — M54.12 LEFT CERVICAL RADICULOPATHY: Status: RESOLVED | Noted: 2019-07-09 | Resolved: 2019-10-24

## 2020-03-15 ENCOUNTER — HEALTH MAINTENANCE LETTER (OUTPATIENT)
Age: 75
End: 2020-03-15

## 2021-01-10 ENCOUNTER — HEALTH MAINTENANCE LETTER (OUTPATIENT)
Age: 76
End: 2021-01-10

## 2021-05-08 ENCOUNTER — HEALTH MAINTENANCE LETTER (OUTPATIENT)
Age: 76
End: 2021-05-08

## 2021-10-23 ENCOUNTER — HEALTH MAINTENANCE LETTER (OUTPATIENT)
Age: 76
End: 2021-10-23

## 2022-06-04 ENCOUNTER — HEALTH MAINTENANCE LETTER (OUTPATIENT)
Age: 77
End: 2022-06-04

## 2022-10-09 ENCOUNTER — HEALTH MAINTENANCE LETTER (OUTPATIENT)
Age: 77
End: 2022-10-09

## 2022-11-10 ENCOUNTER — THERAPY VISIT (OUTPATIENT)
Dept: PHYSICAL THERAPY | Facility: CLINIC | Age: 77
End: 2022-11-10
Payer: COMMERCIAL

## 2022-11-10 DIAGNOSIS — M25.571 PAIN IN JOINT INVOLVING ANKLE AND FOOT, RIGHT: ICD-10-CM

## 2022-11-10 PROCEDURE — 97110 THERAPEUTIC EXERCISES: CPT | Mod: GP | Performed by: PHYSICAL THERAPIST

## 2022-11-10 PROCEDURE — 97161 PT EVAL LOW COMPLEX 20 MIN: CPT | Mod: GP | Performed by: PHYSICAL THERAPIST

## 2022-11-10 NOTE — PROGRESS NOTES
[facility]    OUTPATIENT Physical Therapy ORTHOPEDIC EVALUATION  PLAN OF TREATMENT FOR OUTPATIENT REHABILITATION  (COMPLETE FOR INITIAL CLAIMS ONLY)  Patient's Last Name, First Name, RADHA.I.  YOB: 1945  Mary Waters       Provider s Name:  [facility]   Medical Record No.  [id number]   Start of Care Date:  11/10/22   Onset Date:       Treatment Diagnosis:  R shin splint and mild ankle OA Medical Diagnosis:  Data Unavailable       Goals:     11/10/22 0500   Body Part   Goals listed below are for [place]   Goal #1   Goal #1 ambulation   Previous Functional Level No restrictions   Current Functional Level Minutes patient can walk   Performance Level 30-60 with antalgic gait and 3-4/10 pain at least 50% of the time   STG Target Performance Minutes patient will be able to walk   Performance Level 30-60 with pain and antalgic gait <50% of the time   Rationale for safe community ambulation;to maintain proper body mechanics/posture while ambulating to avoid additional compensatory injury due to improper gait mechanics;to promote a healthy and active lifestyle   Due Date 12/08/22    LTG Target Performance Minutes patient will be able to  walk   Performance Level 30-60 painfree and normal gait 100% of the time   Rationale for safe community ambulation;to maintain proper body mechanics/posture while ambulating to avoid additional compensatory injury due to improper gait mechanics;to promote a healthy and active lifestyle   Due Date 02/07/23       Therapy Frequency:  2x/month  Predicted Duration of Therapy Intervention:  3 months    Pallavi Ruiz, PT                 I CERTIFY THE NEED FOR THESE SERVICES FURNISHED UNDER        THIS PLAN OF TREATMENT AND WHILE UNDER MY CARE .             Physician Signature               Date    X_____________________________________________________                          Certification Date From:  11/10/22   Certification Date To:  02/07/23    Referring Provider:  Mahad Jerome    Initial Assessment        See Epic Evaluation SOC Date: 11/10/22

## 2022-11-10 NOTE — PROGRESS NOTES
Physical Therapy Initial Evaluation  Subjective:  The history is provided by the patient. No  was used.   Patient Health History  Mary Waters being seen for anterior ankle and shin pain with walking 30-60 min.     Date of Onset: 6 months ago, but got MD order on    Problem occurred: Maybe doing a yoga stretch that invloves sitting onto heels and then lying back and she felt tightness and sorenss in that ankle.    Pain score: 1-4/10.  General health as reported by patient is good.  Pertinent medical history includes: none.   Red flags:  None as reported by patient.  Medical allergies: none.   Surgeries include:  Orthopedic surgery. Other surgery history details: has previous R ankle fx with hardware in tibia and fibula.  .    Current medications:  None.    Current occupation is retired.                     Therapist Generated HPI Evaluation         Type of problem:  Right ankle.    This is a new condition.  Condition occurred with:  Insidious onset.  Where condition occurred: for unknown reasons.  Patient reports pain:  Anterior and lower leg.  Pain is described as aching and is intermittent.  Pain is worse during the day.  Since onset symptoms are unchanged.  Associated symptoms:  Loss of motion/stiffness. Exacerbated by: walking>30 min or doing that one yoga pose that pushes the ankle into deep DR.  Relieved by: massage.  Special tests included:  X-ray.  There was none improvement following previous treatment.                          Objective:  Standing Alignment:                Ankle/Foot:  Normal    Gait:  Decreased R hip ext   Gait Type:  Normal               Ankle/Foot Evaluation  ROM:    AROM:    Dorsiflexion:  Left:   Wnl  Right:   Wnl  Plantarflexion:  Left:  Wnl    Right:  Slight end range limit  Inversion:  Left:  Wnl     Right:  Wnl  Eversion:  Wnl     Right:  Wnl        Strength:    Dorsiflexion:  Left: 5/5     Pain:   Right: 4+/5   Pain:  Plantarflexion: Left: 5/5   Pain:   Right:  5-/5  Pain:  Inversion:Left: 5-/5  Pain:     Right: 4/5  Pain:  Eversion:Left: 5/5  Pain:  Right: 4+/5  Pain:              Strength wnl ankle: HIp strength 5/5 except R hip ext 4/5.      PALPATION: normal    EDEMA: normal          MOBILITY TESTING: normal              FUNCTIONAL TESTS:         Quad:      Bilateral Leg Squat: 120   Control is normal control    Proprioception:  Stork Balance Test: Left: 30  Right: 30                                                     General     ROS    Assessment/Plan:    Patient is a 77 year old female with right side ankle complaints.    Patient has the following significant findings with corresponding treatment plan.                Diagnosis 1:  R shin splints and mild ankle OA  Pain -  self management, education and home program  Decreased ROM/flexibility - manual therapy, therapeutic exercise and home program  Decreased strength - therapeutic exercise, therapeutic activities and home program  Impaired muscle performance - neuro re-education and home program  Decreased function - therapeutic activities and home program    Therapy Evaluation Codes:   1) History comprised of:   Personal factors that impact the plan of care:      None.    Comorbidity factors that impact the plan of care are:      None.     Medications impacting care: None.  2) Examination of Body Systems comprised of:   Body structures and functions that impact the plan of care:      Ankle and Hip.   Activity limitations that impact the plan of care are:      Sports and Walking.  3) Clinical presentation characteristics are:   Stable/Uncomplicated.  4) Decision-Making    Low complexity using standardized patient assessment instrument and/or measureable assessment of functional outcome.  Cumulative Therapy Evaluation is: Low complexity.    Previous and current functional limitations:  (See Goal Flow Sheet for this information)    Short term and Long term goals: (See Goal Flow Sheet for this information)      Communication ability:  Patient appears to be able to clearly communicate and understand verbal and written communication and follow directions correctly.  Treatment Explanation - The following has been discussed with the patient:   RX ordered/plan of care  Anticipated outcomes  Possible risks and side effects  This patient would benefit from PT intervention to resume normal activities.   Rehab potential is excellent.    Frequency:  2 X a month, once daily  Duration:  for 3 months  Discharge Plan:  Achieve all LTG.  Independent in home treatment program.  Reach maximal therapeutic benefit.    Please refer to the daily flowsheet for treatment today, total treatment time and time spent performing 1:1 timed codes.

## 2022-12-06 ENCOUNTER — THERAPY VISIT (OUTPATIENT)
Dept: PHYSICAL THERAPY | Facility: CLINIC | Age: 77
End: 2022-12-06
Payer: COMMERCIAL

## 2022-12-06 DIAGNOSIS — M25.571 PAIN IN JOINT INVOLVING ANKLE AND FOOT, RIGHT: Primary | ICD-10-CM

## 2022-12-06 PROCEDURE — 97110 THERAPEUTIC EXERCISES: CPT | Mod: GP | Performed by: PHYSICAL THERAPIST

## 2022-12-20 ENCOUNTER — TRANSCRIBE ORDERS (OUTPATIENT)
Dept: OTHER | Age: 77
End: 2022-12-20

## 2022-12-27 ENCOUNTER — THERAPY VISIT (OUTPATIENT)
Dept: PHYSICAL THERAPY | Facility: CLINIC | Age: 77
End: 2022-12-27
Payer: COMMERCIAL

## 2022-12-27 DIAGNOSIS — M25.571 PAIN IN JOINT INVOLVING ANKLE AND FOOT, RIGHT: Primary | ICD-10-CM

## 2022-12-27 PROCEDURE — 97110 THERAPEUTIC EXERCISES: CPT | Mod: GP | Performed by: PHYSICAL THERAPIST

## 2023-01-12 ENCOUNTER — THERAPY VISIT (OUTPATIENT)
Dept: PHYSICAL THERAPY | Facility: CLINIC | Age: 78
End: 2023-01-12
Payer: COMMERCIAL

## 2023-01-12 DIAGNOSIS — M25.571 PAIN IN JOINT INVOLVING ANKLE AND FOOT, RIGHT: Primary | ICD-10-CM

## 2023-01-12 PROCEDURE — 97110 THERAPEUTIC EXERCISES: CPT | Mod: GP | Performed by: PHYSICAL THERAPIST

## 2023-01-12 NOTE — PROGRESS NOTES
Discharge Note    Progress reporting period is from initial evaluation date (please see noted date below) to Jan 12, 2023.  Linked Episodes   Type: Episode: Status: Noted: Resolved: Last update: Updated by:   PHYSICAL THERAPY [email or web address]//10/22 Active 11/10/2022  1/12/2023 11:41 AM Pallavi Ruiz, PT      Comments:       Please see information below for last relevant information on current status.  Patient seen for 4 visits.    SUBJECTIVE  Subjective changes noted by patient:  When she first started the shoulder exercises it hurt, but that is getting less and less as she does them.  Has gone on 2 30 min walks and had no shin pain.  .  Current pain level is 0/10.     Previous pain level was   (1-4/10).   Changes in function:  Yes (See Goal flowsheet attached for changes in current functional level)  Adverse reaction to treatment or activity: None    OBJECTIVE  Changes noted in objective findings: R hip ext now 5/5.  R ankle strength now all 5/5.     ASSESSMENT/PLAN  Diagnosis: R shin splint and mild ankle OA   Updated problem list and treatment plan:   No further pain or weakness  STG/LTGs have been met or progress has been made towards goals:  Yes, please see goal flowsheet for most current information  Assessment of Progress: current status is unknown.    Last current status:     Self Management Plans:  HEP  I have re-evaluated this patient and find that the nature, scope, duration and intensity of the therapy is appropriate for the medical condition of the patient.  Mary continues to require the following intervention to meet STG and LTG's:  HEP.    Recommendations:  Discharge with current home program.  Patient to follow up with MD as needed.    Please refer to the daily flowsheet for treatment today, total treatment time and time spent performing 1:1 timed codes.

## 2023-06-10 ENCOUNTER — HEALTH MAINTENANCE LETTER (OUTPATIENT)
Age: 78
End: 2023-06-10

## 2024-08-03 ENCOUNTER — HEALTH MAINTENANCE LETTER (OUTPATIENT)
Age: 79
End: 2024-08-03

## 2025-08-16 ENCOUNTER — HEALTH MAINTENANCE LETTER (OUTPATIENT)
Age: 80
End: 2025-08-16